# Patient Record
Sex: MALE | Race: WHITE | NOT HISPANIC OR LATINO | Employment: OTHER | URBAN - METROPOLITAN AREA
[De-identification: names, ages, dates, MRNs, and addresses within clinical notes are randomized per-mention and may not be internally consistent; named-entity substitution may affect disease eponyms.]

---

## 2017-04-04 ENCOUNTER — HISTORICAL (OUTPATIENT)
Dept: ADMINISTRATIVE | Facility: HOSPITAL | Age: 62
End: 2017-04-04

## 2017-04-04 LAB
ALBUMIN SERPL-MCNC: 3.4 G/DL (ref 3.1–4.7)
ALP SERPL-CCNC: 186 IU/L (ref 40–104)
ALT (SGPT): 34 IU/L (ref 3–33)
APTT PPP: 27.7 SEC (ref 21.7–37.8)
AST SERPL-CCNC: 41 IU/L (ref 10–40)
BILIRUB SERPL-MCNC: 0.7 MG/DL (ref 0.3–1)
BUN SERPL-MCNC: 22 MG/DL (ref 8–20)
CALCIUM SERPL-MCNC: 9.4 MG/DL (ref 7.7–10.4)
CHLORIDE: 98 MMOL/L (ref 98–110)
CO2 SERPL-SCNC: 29.6 MMOL/L (ref 22.8–31.6)
CREATININE: 1.15 MG/DL (ref 0.6–1.4)
FERRITIN SERPL-MCNC: 412 NG/ML (ref 37–201)
FOLATE SERPL-MCNC: 5.9 NG/ML (ref 2.2–11.2)
GLUCOSE: 105 MG/DL (ref 70–99)
HCT VFR BLD AUTO: 42.2 % (ref 39–55)
HGB BLD-MCNC: 13.2 G/DL (ref 14–16)
INR PPP: 1.1
MCH RBC QN AUTO: 27 PG (ref 25–35)
MCHC RBC AUTO-ENTMCNC: 31.3 G/DL (ref 31–36)
MCV RBC AUTO: 86.3 FL (ref 80–100)
NUCLEATED RBCS: 0 %
PLATELET # BLD AUTO: 333 K/UL (ref 140–440)
POTASSIUM SERPL-SCNC: 3.9 MMOL/L (ref 3.5–5)
PROT SERPL-MCNC: 7.3 G/DL (ref 6–8.2)
PROTHROMBIN TIME: 14.3 SEC (ref 11.3–15.2)
RBC # BLD AUTO: 4.89 M/UL (ref 4.3–5.9)
SODIUM: 140 MMOL/L (ref 134–144)
VITAMIN B12: 568 PG/ML (ref 62–940)
WBC # BLD AUTO: 11.6 K/UL (ref 5–10)

## 2017-04-11 LAB
BASOPHILS NFR BLD: 0 K/UL (ref 0–0.2)
BASOPHILS NFR BLD: 0.1 %
BUN SERPL-MCNC: 12 MG/DL (ref 8–20)
CALCIUM SERPL-MCNC: 8.3 MG/DL (ref 7.7–10.4)
CHLORIDE: 105 MMOL/L (ref 98–110)
CO2 SERPL-SCNC: 27.7 MMOL/L (ref 22.8–31.6)
CREATININE: 0.82 MG/DL (ref 0.6–1.4)
EOSINOPHIL NFR BLD: 0 %
EOSINOPHIL NFR BLD: 0 K/UL (ref 0–0.7)
ERYTHROCYTE [DISTWIDTH] IN BLOOD BY AUTOMATED COUNT: 14.1 % (ref 11.7–14.9)
GLUCOSE: 154 MG/DL (ref 70–99)
GRAN #: 10 K/UL (ref 1.4–6.5)
GRAN%: 82.7 %
HCT VFR BLD AUTO: 34.6 % (ref 39–55)
HGB BLD-MCNC: 10.8 G/DL (ref 14–16)
IMMATURE GRANS (ABS): 0.1 K/UL (ref 0–1)
IMMATURE GRANULOCYTES: 0.6 %
LYMPH #: 1.1 K/UL (ref 1.2–3.4)
LYMPH%: 8.7 %
MCH RBC QN AUTO: 26.6 PG (ref 25–35)
MCHC RBC AUTO-ENTMCNC: 31.2 G/DL (ref 31–36)
MCV RBC AUTO: 85.2 FL (ref 80–100)
MONO #: 1 K/UL (ref 0.1–0.6)
MONO%: 7.9 %
NUCLEATED RBCS: 0 %
PLATELET # BLD AUTO: 270 K/UL (ref 140–440)
PMV BLD AUTO: 8.5 FL (ref 8.8–12.7)
POTASSIUM SERPL-SCNC: 4.1 MMOL/L (ref 3.5–5)
RBC # BLD AUTO: 4.06 M/UL (ref 4.3–5.9)
SODIUM: 138 MMOL/L (ref 134–144)
WBC # BLD AUTO: 12.1 K/UL (ref 5–10)

## 2017-04-12 LAB
BASOPHILS NFR BLD: 0 K/UL (ref 0–0.2)
BASOPHILS NFR BLD: 0.2 %
BUN SERPL-MCNC: 13 MG/DL (ref 8–20)
CALCIUM SERPL-MCNC: 8.3 MG/DL (ref 7.7–10.4)
CHLORIDE: 102 MMOL/L (ref 98–110)
CO2 SERPL-SCNC: 28.7 MMOL/L (ref 22.8–31.6)
CREATININE: 0.87 MG/DL (ref 0.6–1.4)
EOSINOPHIL NFR BLD: 0 K/UL (ref 0–0.7)
EOSINOPHIL NFR BLD: 0.4 %
ERYTHROCYTE [DISTWIDTH] IN BLOOD BY AUTOMATED COUNT: 14.3 % (ref 11.7–14.9)
GLUCOSE: 106 MG/DL (ref 70–99)
GRAN #: 7.9 K/UL (ref 1.4–6.5)
GRAN%: 76.6 %
HCT VFR BLD AUTO: 35.9 % (ref 39–55)
HGB BLD-MCNC: 11.1 G/DL (ref 14–16)
IMMATURE GRANS (ABS): 0.1 K/UL (ref 0–1)
IMMATURE GRANULOCYTES: 0.6 %
LYMPH #: 1.1 K/UL (ref 1.2–3.4)
LYMPH%: 11.1 %
MCH RBC QN AUTO: 26.9 PG (ref 25–35)
MCHC RBC AUTO-ENTMCNC: 30.9 G/DL (ref 31–36)
MCV RBC AUTO: 86.9 FL (ref 80–100)
MONO #: 1.1 K/UL (ref 0.1–0.6)
MONO%: 11.1 %
NUCLEATED RBCS: 0 %
PLATELET # BLD AUTO: 249 K/UL (ref 140–440)
PMV BLD AUTO: 8.4 FL (ref 8.8–12.7)
POTASSIUM SERPL-SCNC: 3.6 MMOL/L (ref 3.5–5)
RBC # BLD AUTO: 4.13 M/UL (ref 4.3–5.9)
SODIUM: 140 MMOL/L (ref 134–144)
WBC # BLD AUTO: 10.3 K/UL (ref 5–10)

## 2017-04-13 LAB
BASOPHILS NFR BLD: 0 K/UL (ref 0–0.2)
BASOPHILS NFR BLD: 0.1 %
BUN SERPL-MCNC: 12 MG/DL (ref 8–20)
CALCIUM SERPL-MCNC: 8.2 MG/DL (ref 7.7–10.4)
CHLORIDE: 99 MMOL/L (ref 98–110)
CO2 SERPL-SCNC: 30.2 MMOL/L (ref 22.8–31.6)
CREATININE: 0.82 MG/DL (ref 0.6–1.4)
EOSINOPHIL NFR BLD: 0 K/UL (ref 0–0.7)
EOSINOPHIL NFR BLD: 0.3 %
ERYTHROCYTE [DISTWIDTH] IN BLOOD BY AUTOMATED COUNT: 14.2 % (ref 11.7–14.9)
GLUCOSE: 99 MG/DL (ref 70–99)
GRAN #: 8.7 K/UL (ref 1.4–6.5)
GRAN%: 81.2 %
HCT VFR BLD AUTO: 34.4 % (ref 39–55)
HGB BLD-MCNC: 10.6 G/DL (ref 14–16)
IMMATURE GRANS (ABS): 0.1 K/UL (ref 0–1)
IMMATURE GRANULOCYTES: 0.8 %
LYMPH #: 0.8 K/UL (ref 1.2–3.4)
LYMPH%: 7.4 %
MCH RBC QN AUTO: 26.7 PG (ref 25–35)
MCHC RBC AUTO-ENTMCNC: 30.8 G/DL (ref 31–36)
MCV RBC AUTO: 86.6 FL (ref 80–100)
MONO #: 1.1 K/UL (ref 0.1–0.6)
MONO%: 10.2 %
NUCLEATED RBCS: 0 %
PLATELET # BLD AUTO: 228 K/UL (ref 140–440)
PMV BLD AUTO: 8.6 FL (ref 8.8–12.7)
POTASSIUM SERPL-SCNC: 3.4 MMOL/L (ref 3.5–5)
RBC # BLD AUTO: 3.97 M/UL (ref 4.3–5.9)
SODIUM: 139 MMOL/L (ref 134–144)
WBC # BLD AUTO: 10.8 K/UL (ref 5–10)

## 2017-04-15 LAB
BUN SERPL-MCNC: 10 MG/DL (ref 8–20)
CALCIUM SERPL-MCNC: 8.1 MG/DL (ref 7.7–10.4)
CHLORIDE: 98 MMOL/L (ref 98–110)
CO2 SERPL-SCNC: 28.7 MMOL/L (ref 22.8–31.6)
CREATININE: 0.85 MG/DL (ref 0.6–1.4)
GLUCOSE: 130 MG/DL (ref 70–99)
POTASSIUM SERPL-SCNC: 2.9 MMOL/L (ref 3.5–5)
SODIUM: 137 MMOL/L (ref 134–144)

## 2017-04-20 LAB
BUN SERPL-MCNC: 15 MG/DL (ref 8–20)
CALCIUM SERPL-MCNC: 8.6 MG/DL (ref 7.7–10.4)
CHLORIDE: 98 MMOL/L (ref 98–110)
CO2 SERPL-SCNC: 27.6 MMOL/L (ref 22.8–31.6)
CREATININE: 0.89 MG/DL (ref 0.6–1.4)
GLUCOSE: 107 MG/DL (ref 70–99)
POTASSIUM SERPL-SCNC: 3.1 MMOL/L (ref 3.5–5)
SODIUM: 138 MMOL/L (ref 134–144)

## 2017-04-24 ENCOUNTER — HISTORICAL (OUTPATIENT)
Dept: ADMINISTRATIVE | Facility: HOSPITAL | Age: 62
End: 2017-04-24

## 2017-05-08 LAB
ALBUMIN SERPL-MCNC: 2.9 G/DL (ref 3.1–4.7)
ALP SERPL-CCNC: 425 IU/L (ref 40–104)
ALT (SGPT): 63 IU/L (ref 3–33)
AST SERPL-CCNC: 87 IU/L (ref 10–40)
BASOPHILS NFR BLD: 0 K/UL (ref 0–0.2)
BASOPHILS NFR BLD: 0.2 %
BILIRUB SERPL-MCNC: 0.6 MG/DL (ref 0.3–1)
BUN SERPL-MCNC: 17 MG/DL (ref 8–20)
CALCIUM SERPL-MCNC: 9.2 MG/DL (ref 7.7–10.4)
CEA: 7117.5 NG/ML
CHLORIDE: 98 MMOL/L (ref 98–110)
CO2 SERPL-SCNC: 25.9 MMOL/L (ref 22.8–31.6)
CREATININE: 1.04 MG/DL (ref 0.6–1.4)
EOSINOPHIL NFR BLD: 0.1 K/UL (ref 0–0.7)
EOSINOPHIL NFR BLD: 0.4 %
ERYTHROCYTE [DISTWIDTH] IN BLOOD BY AUTOMATED COUNT: 15 % (ref 12.5–14.5)
FERRITIN SERPL-MCNC: 874 NG/ML (ref 37–201)
GLUCOSE: 88 MG/DL (ref 70–99)
GRAN #: 10.8 K/UL (ref 1.4–6.5)
GRAN%: 77.6 %
HCT VFR BLD AUTO: 38 % (ref 39–55)
HGB BLD-MCNC: 11.1 G/DL (ref 14–16)
IMMATURE GRANS (ABS): 0.2 K/UL (ref 0–1)
IMMATURE GRANULOCYTES: 1.5 %
LYMPH #: 1.4 K/UL (ref 1.2–3.4)
LYMPH%: 10.2 %
MCH RBC QN AUTO: 24.8 PG (ref 25–35)
MCHC RBC AUTO-ENTMCNC: 29.2 G/DL (ref 31–36)
MCV RBC AUTO: 85 FL (ref 80–100)
MONO #: 1.4 K/UL (ref 0.1–0.6)
MONO%: 10.1 %
NUCLEATED RBCS: 0 %
NUCLEATED RED BLOOD CELLS: 0 /100 WBC
PERFORMED BY:: ABNORMAL
PLATELET # BLD AUTO: 318 K/UL (ref 140–440)
PMV BLD AUTO: 8.2 FL (ref 8.8–12.7)
POTASSIUM SERPL-SCNC: 4.2 MMOL/L (ref 3.5–5)
PROT SERPL-MCNC: 7 G/DL (ref 6–8.2)
RBC # BLD AUTO: 4.47 M/UL (ref 4.3–5.9)
SODIUM: 136 MMOL/L (ref 134–144)
WBC # BLD: 13.9 K/UL (ref 5–10)

## 2017-05-22 ENCOUNTER — DOCUMENTATION ONLY (OUTPATIENT)
Dept: RADIATION ONCOLOGY | Facility: CLINIC | Age: 62
End: 2017-05-22

## 2017-05-22 ENCOUNTER — OFFICE VISIT (OUTPATIENT)
Dept: HEMATOLOGY/ONCOLOGY | Facility: CLINIC | Age: 62
End: 2017-05-22
Payer: MEDICAID

## 2017-05-22 VITALS
TEMPERATURE: 98 F | DIASTOLIC BLOOD PRESSURE: 64 MMHG | BODY MASS INDEX: 22.69 KG/M2 | SYSTOLIC BLOOD PRESSURE: 88 MMHG | HEART RATE: 108 BPM | WEIGHT: 172 LBS | RESPIRATION RATE: 20 BRPM

## 2017-05-22 DIAGNOSIS — M54.50 LOW BACK PAIN, NON-SPECIFIC: ICD-10-CM

## 2017-05-22 DIAGNOSIS — R64 CACHEXIA: ICD-10-CM

## 2017-05-22 DIAGNOSIS — C78.7 ADENOCARCINOMA OF COLON METASTATIC TO LIVER: ICD-10-CM

## 2017-05-22 DIAGNOSIS — C18.9 ADENOCARCINOMA OF COLON METASTATIC TO LIVER: ICD-10-CM

## 2017-05-22 DIAGNOSIS — E86.0 DEHYDRATION, MODERATE: ICD-10-CM

## 2017-05-22 DIAGNOSIS — C18.2 COLON CANCER, ASCENDING: Primary | ICD-10-CM

## 2017-05-22 PROCEDURE — 99214 OFFICE O/P EST MOD 30 MIN: CPT | Mod: ,,, | Performed by: INTERNAL MEDICINE

## 2017-05-22 RX ORDER — TRAMADOL HYDROCHLORIDE 50 MG/1
50 TABLET ORAL EVERY 6 HOURS PRN
COMMUNITY
End: 2017-07-26 | Stop reason: ALTCHOICE

## 2017-05-22 RX ORDER — DRONABINOL 2.5 MG/1
2.5 CAPSULE ORAL
Qty: 60 CAPSULE | Refills: 0
Start: 2017-05-22 | End: 2017-06-13

## 2017-05-22 RX ORDER — HYDROCODONE BITARTRATE AND ACETAMINOPHEN 10; 325 MG/1; MG/1
1 TABLET ORAL EVERY 6 HOURS PRN
COMMUNITY
End: 2017-07-25

## 2017-05-22 RX ORDER — DOCUSATE SODIUM 100 MG/1
100 CAPSULE, LIQUID FILLED ORAL 2 TIMES DAILY
COMMUNITY
End: 2017-07-25

## 2017-05-22 NOTE — PROGRESS NOTES
"Wilian is a 60 yo male with colon cancer with metastasis to the liver, who is not getting treatment at this time.  He evidently had an appointment with Dr. Porter and he was severely dehydrated and was sent to infusion for fluids.  He was prescribed marinol as an appetite stimulant.  He did state that he smokes marijuana at home and it does help with his appetite.  He is lactose intolerant.    Weight: 172 lbs. Weight in December 2016 : 216 lbs. He has lost 44 lbs. Over the past 5 months = 20% loss of weight.  He is at high risk nutritionally.    He attributes his weight loss to "food tastes badly," and poor appetite.  His bowels move daily.  Diet recall shows he is eating around 1000 calories daily.  He is drinking approximately 4-5 cups of fluid daily, mostly water and lactose free milk and milkshakes.    Plan: 1. Advised Wilian to aim for at least 2000 calories daily. 2. Advised that he eat or drink 1/2 cup of something every 1-2 hours.  Wife agreed to purchase nutrition supplements and easy to grab foods. 3. Gave list of calorie and protein dense foods to choose.  Gave list of snack and small meal ideas. 4. Gave tips on how to add calories, nothing plain etc. 5. Advised that he increase his fluid intake to 8 cups daily. Gave samples of vanilla ensure plus and vanilla boost plus as well as coupons. 6. RD contact information left with patient and wife.  "

## 2017-05-23 ENCOUNTER — TELEPHONE (OUTPATIENT)
Dept: HEMATOLOGY/ONCOLOGY | Facility: CLINIC | Age: 62
End: 2017-05-23

## 2017-05-23 DIAGNOSIS — R64 MALIGNANT CACHEXIA: ICD-10-CM

## 2017-05-23 DIAGNOSIS — R63.0 ANOREXIA: Primary | ICD-10-CM

## 2017-05-23 NOTE — TELEPHONE ENCOUNTER
Dr. Porter,     Antonieta at [facility] called stating that the marinol script  you gave this person is not being covered by his [employer] company. She did a test claim on Megace and says this is covered at $.50  for a bottle. Do you want to order this med instead?     Harriet    [id number]  [facility]    [phone] [facility]

## 2017-05-23 NOTE — TELEPHONE ENCOUNTER
Patient's wife given information regarding establishing PCP.  Patient has an appointment with Dr. Viola Amin on 6/21/17 @ 9:20am.  Instructed to arrive 20 minutes early to complete new patient information.

## 2017-05-23 NOTE — PROGRESS NOTES
[facility] HEME/ONC PROGRESS NOTE      Subjective:       Patient ID:   Sarbjit Ruiz  62 y.o. male.  1955      Chief Complaint:  Weakness    History of Present Illness:   63 y/o male with little po intake, taste is off, and increasing weakness.  S/P R hemicolectomy, liver bx.  Stage IV colon cancer.  Smoking marijuana to try increase appetite.  Hx of LBP, didc dx.  Turning over in bed, hurt his back with increased pain.  Spends most of time in bed or wheelchair.  He ranks himself at 1 out of 1-10.    Patient returns today for a regularly scheduled follow-up visit.             ROS:   GEN: loss of appetite, and weight .  HEENT: normal with no HA's, sore throat, stiff neck, changes in vision  CV: normal with no CP, SOB, PND, HODGES or orthopnea  PULM: normal with no SOB, cough, hemoptysis, sputum or pleuritic pain  GI: See PI., no abdominal pain, nausea, vomiting, constipation, diarrhea, melanotic stools, BRBPR, or hematemesis  : normal with no hematuria, dysuria  BREAST: normal with no mass, discharge, pain  SKIN: normal with no rash, erythema, bruising, or swelling  Neuro: LBP+    Allergies:  Review of patient's allergies indicates:  No Known Allergies    Medications:    Current Outpatient Prescriptions:     calcium carbonate (CALCIUM ANTACID) 300 mg (750 mg) Chew, Take 1 mg by mouth 2 (two) times daily as needed., Disp: , Rfl:     docusate sodium (COLACE) 100 MG capsule, Take 100 mg by mouth 2 (two) times daily., Disp: , Rfl:     hydrocodone-acetaminophen 10-325mg (NORCO)  mg Tab, Take 1 tablet by mouth every 6 (six) hours as needed for Pain., Disp: , Rfl:     L. ACIDOPHILUS/BIFID. ANIMALIS (ONE-A-DAY TRUBIOTICS ORAL), Take by mouth., Disp: , Rfl:     tramadol (ULTRAM) 50 mg tablet, Take 50 mg by mouth every 6 (six) hours as needed for Pain., Disp: , Rfl:     dronabinol (MARINOL) 2.5 MG capsule, Take 1 capsule (2.5 mg total) by mouth 2 (two) times daily before meals., Disp: 60 capsule, Rfl: 0     ranitidine (ZANTAC) 300 MG capsule, Take 1 capsule (300 mg total) by mouth every evening., Disp: 30 capsule, Rfl: 3    ranitidine (ZANTAC) 300 MG tablet, Take 1 tablet (300 mg total) by mouth every evening., Disp: 30 tablet, Rfl: 5      Objective:     Vitals:  Blood pressure (!) 88/64, pulse 108, temperature 97.8 °F (36.6 °C), resp. rate 20, weight 78 kg (172 lb).    Physical Examination:   GEN: chronically ill in appearance, thin , in wheelchair  HEAD: atraumatic and normocephalic, thin  EYES: no pallor, no icterus, PERRLA  ENT: OMM, no pharyngeal erythema, external ears WNL; no nasal discharge; no thrush  NECK: no masses, thyroid normal, trachea midline, no LAD/LN's, supple  CV: RRR with no murmur; normal pulse; normal S1 and S2; no pedal edema  CHEST: Normal respiratory effort; CTAB; normal breath sounds; no wheeze or crackles  ABDOM: nontender and nondistended; soft; normal bowel sounds; no rebound/guarding  MUSC/Skeletal: ROM normal; no crepitus; joints normal; no deformities or arthropathy  EXTREM: no clubbing, cyanosis, inflammation or swelling  SKIN: no rashes, lesions, ulcers, petechiae or subcutaneous nodules  : no torre  NEURO: grossly intact; motor/sensory WNL; AAOx3; no tremors  PSYCH: normal mood, affect and behavior  LYMPH: normal cervical, supraclavicular, axillary and groin LN'  Neuro: no focal findings.  NT L/S spine area.  Calf NT.  No edema.            Labs:   Lab Results   Component Value Date    WBC 13.9 (H) 05/08/2017    HGB 11.1 (L) 05/08/2017    HCT 38.0 (L) 05/08/2017    MCV 86.6 04/13/2017     05/08/2017    CMP  Sodium   Date Value Ref Range Status   05/08/2017 136 134 - 144 mmol/L      Potassium   Date Value Ref Range Status   05/08/2017 4.2 3.5 - 5.0 mmol/L      Chloride   Date Value Ref Range Status   05/08/2017 98 98 - 110 mmol/L      CO2   Date Value Ref Range Status   05/08/2017 25.9 22.8 - 31.6 mmol/L      Glucose   Date Value Ref Range Status   05/08/2017 88 70 - 99 mg/dL       BUN, Bld   Date Value Ref Range Status   05/08/2017 17 8 - 20 mg/dL      Creatinine   Date Value Ref Range Status   05/08/2017 1.04 0.60 - 1.40 mg/dL      Calcium   Date Value Ref Range Status   05/08/2017 9.2 7.7 - 10.4 mg/dL      Total Protein   Date Value Ref Range Status   05/08/2017 7.0 6.0 - 8.2 g/dL      Albumin   Date Value Ref Range Status   05/08/2017 2.9 (L) 3.1 - 4.7 g/dL      Total Bilirubin   Date Value Ref Range Status   05/08/2017 0.6 0.3 - 1.0 mg/dL      Alkaline Phosphatase   Date Value Ref Range Status   05/08/2017 425 (H) 40 - 104 IU/L      AST   Date Value Ref Range Status   05/08/2017 87 (H) 10 - 40 IU/L      I have reviewed all available lab results and radiology reports.    Radiology/Diagnognostics:  5/18/17  Post op change, increasing liver metastases, mild splenomegaly    Assessment/Plan:   (1) 62 y.o. male with diagnosis of Stage IV colon cancer of ascending colon with liver metastases.  Due to start FOLFOX/Avastin for metastatic dx.  But performance status is low with BP 88/  ,decreased po intake, clinical dehydration, further loss of weight.      (2)LBP, disc dx. Positional pain.          ICD-10-CM ICD-9-CM   1. Colon cancer, ascending C18.2 153.6   2. Adenocarcinoma of colon metastatic to liver C18.9 153.9    C78.7 197.7   3. Cachexia R64 799.4   4. Dehydration, moderate E86.0 276.51   5. Low back pain, non-specific M54.5 724.2       Discussion:   Need to defer chemo for now.  Give hydration daily x's 3 days.  Add marinol to try increase po intake.  He has po pain meds.  SW consult to try increase assistance, held with getting PMD for him.  Dietician consult to increase po intake.  See me 1 weeks.  If performance status improves sufficiently, then will try to start FOLfiri/Avastin for palliation.        I have explained and the patient understands all of  the current recommendation(s). I have answered all of their questions to the best of my ability and to their complete  satisfaction.   The patient is to continue with the current management plan.    RTC in 1 weeks        Electronically signed by LUBNA Pelletier

## 2017-05-24 ENCOUNTER — DOCUMENTATION ONLY (OUTPATIENT)
Dept: HEMATOLOGY/ONCOLOGY | Facility: CLINIC | Age: 62
End: 2017-05-24

## 2017-05-25 RX ORDER — MEGESTROL ACETATE 40 MG/ML
200 SUSPENSION ORAL DAILY
Qty: 240 ML | Refills: 2 | Status: SHIPPED | OUTPATIENT
Start: 2017-05-25 | End: 2017-06-13

## 2017-05-25 RX ORDER — MEGESTROL ACETATE 40 MG/ML
200 SUSPENSION ORAL DAILY
Qty: 240 ML | Refills: 2 | Status: SHIPPED | OUTPATIENT
Start: 2017-05-25 | End: 2017-05-25 | Stop reason: SDUPTHER

## 2017-05-25 RX ORDER — NEOMYCIN SULFATE 500 MG/1
TABLET ORAL
Refills: 0 | COMMUNITY
Start: 2017-04-07 | End: 2017-06-13 | Stop reason: ALTCHOICE

## 2017-05-25 RX ORDER — ERYTHROMYCIN 500 MG/1
TABLET, COATED ORAL
Refills: 0 | COMMUNITY
Start: 2017-04-07 | End: 2017-06-13 | Stop reason: ALTCHOICE

## 2017-05-25 RX ORDER — HYDROCODONE BITARTRATE AND ACETAMINOPHEN 7.5; 325 MG/1; MG/1
TABLET ORAL
Refills: 0 | COMMUNITY
Start: 2017-04-24 | End: 2017-07-25

## 2017-05-25 RX ORDER — ESCITALOPRAM OXALATE 10 MG/1
TABLET ORAL
Refills: 6 | COMMUNITY
Start: 2017-04-26 | End: 2017-07-26 | Stop reason: DRUGHIGH

## 2017-05-25 RX ORDER — ALPRAZOLAM 0.25 MG/1
TABLET ORAL
Refills: 0 | COMMUNITY
Start: 2017-03-25 | End: 2017-07-26 | Stop reason: ALTCHOICE

## 2017-05-26 ENCOUNTER — TELEPHONE (OUTPATIENT)
Dept: HEMATOLOGY/ONCOLOGY | Facility: CLINIC | Age: 62
End: 2017-05-26

## 2017-05-26 NOTE — TELEPHONE ENCOUNTER
Pt wife called in said she went to [facility] in [place] and RX was not called in. In prior messages it shows that it was called in. Please call pt wife back.

## 2017-05-26 NOTE — TELEPHONE ENCOUNTER
Spoke with pts wife notified that  has called in a script for megace. Insurance would not cover marinol. Understanding verbalized

## 2017-05-30 ENCOUNTER — OFFICE VISIT (OUTPATIENT)
Dept: HEMATOLOGY/ONCOLOGY | Facility: CLINIC | Age: 62
End: 2017-05-30
Payer: MEDICAID

## 2017-05-30 VITALS
BODY MASS INDEX: 22.56 KG/M2 | HEART RATE: 75 BPM | TEMPERATURE: 98 F | WEIGHT: 171 LBS | SYSTOLIC BLOOD PRESSURE: 90 MMHG | DIASTOLIC BLOOD PRESSURE: 65 MMHG | RESPIRATION RATE: 18 BRPM

## 2017-05-30 DIAGNOSIS — C18.4 CANCER OF TRANSVERSE COLON METASTATIC TO INTRA-ABDOMINAL LYMPH NODE: Primary | ICD-10-CM

## 2017-05-30 DIAGNOSIS — C77.2 CANCER OF TRANSVERSE COLON METASTATIC TO INTRA-ABDOMINAL LYMPH NODE: Primary | ICD-10-CM

## 2017-05-30 DIAGNOSIS — D50.0 IRON DEFICIENCY ANEMIA DUE TO CHRONIC BLOOD LOSS: ICD-10-CM

## 2017-05-30 DIAGNOSIS — C78.7 ADENOCARCINOMA OF COLON METASTATIC TO LIVER: ICD-10-CM

## 2017-05-30 DIAGNOSIS — C18.9 ADENOCARCINOMA OF COLON METASTATIC TO LIVER: ICD-10-CM

## 2017-05-30 PROCEDURE — 99214 OFFICE O/P EST MOD 30 MIN: CPT | Mod: ,,, | Performed by: INTERNAL MEDICINE

## 2017-06-05 ENCOUNTER — HISTORICAL (OUTPATIENT)
Dept: ADMINISTRATIVE | Facility: HOSPITAL | Age: 62
End: 2017-06-05

## 2017-06-05 LAB
ALBUMIN SERPL-MCNC: 2 G/DL (ref 3.1–4.7)
ALP SERPL-CCNC: 570 IU/L (ref 40–104)
ALT (SGPT): 33 IU/L (ref 3–33)
APTT PPP: 26.7 SEC (ref 21.7–37.8)
AST SERPL-CCNC: 93 IU/L (ref 10–40)
BASOPHILS NFR BLD: 0.1 K/UL (ref 0–0.2)
BASOPHILS NFR BLD: 0.2 %
BILIRUB SERPL-MCNC: 2.7 MG/DL (ref 0.3–1)
BUN SERPL-MCNC: 25 MG/DL (ref 8–20)
CALCIUM SERPL-MCNC: 9.5 MG/DL (ref 7.7–10.4)
CEA: 8726.3 NG/ML
CHLORIDE: 97 MMOL/L (ref 98–110)
CO2 SERPL-SCNC: 24.9 MMOL/L (ref 22.8–31.6)
CREATININE: 1.24 MG/DL (ref 0.6–1.4)
EOSINOPHIL NFR BLD: 0 K/UL (ref 0–0.7)
EOSINOPHIL NFR BLD: 0.1 %
ERYTHROCYTE [DISTWIDTH] IN BLOOD BY AUTOMATED COUNT: 19.5 % (ref 11.7–14.9)
GLUCOSE: 106 MG/DL (ref 70–99)
GRAN #: 17.1 K/UL (ref 1.4–6.5)
GRAN%: 84.7 %
HCT VFR BLD AUTO: 30 % (ref 39–55)
HGB BLD-MCNC: 9.4 G/DL (ref 14–16)
IMMATURE GRANS (ABS): 0.5 K/UL (ref 0–1)
IMMATURE GRANULOCYTES: 2.5 %
INR PPP: 1.1
LYMPH #: 1.3 K/UL (ref 1.2–3.4)
LYMPH%: 6.4 %
MCH RBC QN AUTO: 26.4 PG (ref 25–35)
MCHC RBC AUTO-ENTMCNC: 31.3 G/DL (ref 31–36)
MCV RBC AUTO: 84.3 FL (ref 80–100)
MONO #: 1.2 K/UL (ref 0.1–0.6)
MONO%: 6.1 %
NUCLEATED RBCS: 0 %
PLATELET # BLD AUTO: 220 K/UL (ref 140–440)
PMV BLD AUTO: 8.9 FL (ref 8.8–12.7)
POTASSIUM SERPL-SCNC: 4 MMOL/L (ref 3.5–5)
PROT SERPL-MCNC: 5.9 G/DL (ref 6–8.2)
PROTHROMBIN TIME: 14.6 SEC (ref 11.3–15.2)
RBC # BLD AUTO: 3.56 M/UL (ref 4.3–5.9)
SODIUM: 132 MMOL/L (ref 134–144)
WBC # BLD AUTO: 20.2 K/UL (ref 5–10)

## 2017-06-05 NOTE — PROGRESS NOTES
[facility] HEME/ONC PROGRESS NOTE      Subjective:       Patient ID:   Sarbjit Ruiz  62 y.o. male.  1955      Chief Complaint:  Colon Cancer (liver mets; c/os extreme weakness  )      History of Present Illness:     Patient returns today for a regularly scheduled follow-up visit.   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  He has liver metastases, failure to thrive, chronic pain sx.                ROS:   GEN: normal without any fever, night sweats or weight loss  HEENT: normal with no HA's, sore throat, stiff neck, changes in vision  CV: normal with no CP, SOB, PND, HODGES or orthopnea  PULM: HODGES +., no SOB, cough, hemoptysis, sputum or pleuritic pain  GI: See HPI, Liver metasteses. no abdominal pain, nausea, vomiting, constipation, diarrhea, melanotic stools, BRBPR, or hematemesis  : BPH.+.   normal with no hematuria, dysuria  BREAST: normal with no mass, discharge, pain  SKIN: normal with no rash, erythema, bruising, or swelling  Psych: depression hx      Hx hernia repair, T & A.      Allergies:  Review of patient's allergies indicates:  No Known Allergies    Medications:    Current Outpatient Prescriptions:     alprazolam (XANAX) 0.25 MG tablet, TK 1 T PO Q 8 H PRN P, Disp: , Rfl: 0    calcium carbonate (CALCIUM ANTACID) 300 mg (750 mg) Chew, Take 1 mg by mouth 2 (two) times daily as needed., Disp: , Rfl:     docusate sodium (COLACE) 100 MG capsule, Take 100 mg by mouth 2 (two) times daily., Disp: , Rfl:     dronabinol (MARINOL) 2.5 MG capsule, Take 1 capsule (2.5 mg total) by mouth 2 (two) times daily before meals., Disp: 60 capsule, Rfl: 0    erythromycin base (E-MYCIN) 500 MG tablet, , Disp: , Rfl: 0    escitalopram oxalate (LEXAPRO) 10 MG tablet, TK 1 T PO QD, Disp: , Rfl: 6    hydrocodone-acetaminophen 10-325mg (NORCO)  mg Tab, Take 1 tablet by mouth every 6 (six) hours as needed for Pain., Disp: , Rfl:     hydrocodone-acetaminophen 7.5-325mg (NORCO) 7.5-325 mg per tablet, TK  1 T PO Q 4 H PRN, Disp: , Rfl: 0    L. ACIDOPHILUS/BIFID. ANIMALIS (ONE-A-DAY TRUBIOTICS ORAL), Take by mouth., Disp: , Rfl:     megestrol (MEGACE) 400 mg/10 mL (40 mg/mL) Susp, Take 5 mLs (200 mg total) by mouth once daily., Disp: 240 mL, Rfl: 2    neomycin (MYCIFRADIN) 500 mg Tab, , Disp: , Rfl: 0    ranitidine (ZANTAC) 300 MG capsule, Take 1 capsule (300 mg total) by mouth every evening., Disp: 30 capsule, Rfl: 3    ranitidine (ZANTAC) 300 MG tablet, Take 1 tablet (300 mg total) by mouth every evening., Disp: 30 tablet, Rfl: 5    tramadol (ULTRAM) 50 mg tablet, Take 50 mg by mouth every 6 (six) hours as needed for Pain., Disp: , Rfl:       Objective:     Vitals:  Blood pressure 90/65, pulse 75, temperature 97.8 °F (36.6 °C), resp. rate 18, weight 77.6 kg (171 lb).    Physical Examination:   GEN: chronically ill in appearance., comfortable; AAOx3  HEAD: atraumatic and normocephalic  EYES: + ,  pallor, no icterus, PERRLA  ENT: OMM, no pharyngeal erythema, external ears WNL; no nasal discharge; no thrush  NECK: no masses, thyroid normal, trachea midline, no LAD/LN's, supple  CV: RRR with no murmur; normal pulse; normal S1 and S2; no pedal edema  CHEST: Normal respiratory effort; CTAB; normal breath sounds; no wheeze or crackles  ABDOM: nontender and nondistended; soft; normal bowel sounds; no rebound/guarding, abdomenal wound healing  MUSC/Skeletal: ROM normal; no crepitus; joints normal; no deformities or arthropathy  EXTREM: no clubbing, cyanosis, inflammation or swelling  SKIN: no rashes, lesions, ulcers, petechiae or subcutaneous nodules  : no torre  NEURO: grossly intact; motor/sensory WNL; AAOx3; no tremors  PSYCH: normal mood, affect and behavior  LYMPH: normal cervical, supraclavicular, axillary and groin LN's            Labs:   Lab Results   Component Value Date    WBC 13.9 (H) 05/08/2017    HGB 11.1 (L) 05/08/2017    HCT 38.0 (L) 05/08/2017    MCV 86.6 04/13/2017     05/08/2017     CMP  Sodium   Date Value Ref Range Status   05/08/2017 136 134 - 144 mmol/L      Potassium   Date Value Ref Range Status   05/08/2017 4.2 3.5 - 5.0 mmol/L      Chloride   Date Value Ref Range Status   05/08/2017 98 98 - 110 mmol/L      CO2   Date Value Ref Range Status   05/08/2017 25.9 22.8 - 31.6 mmol/L      Glucose   Date Value Ref Range Status   05/08/2017 88 70 - 99 mg/dL      BUN, Bld   Date Value Ref Range Status   05/08/2017 17 8 - 20 mg/dL      Creatinine   Date Value Ref Range Status   05/08/2017 1.04 0.60 - 1.40 mg/dL      Calcium   Date Value Ref Range Status   05/08/2017 9.2 7.7 - 10.4 mg/dL      Total Protein   Date Value Ref Range Status   05/08/2017 7.0 6.0 - 8.2 g/dL      Albumin   Date Value Ref Range Status   05/08/2017 2.9 (L) 3.1 - 4.7 g/dL      Total Bilirubin   Date Value Ref Range Status   05/08/2017 0.6 0.3 - 1.0 mg/dL      Alkaline Phosphatase   Date Value Ref Range Status   05/08/2017 425 (H) 40 - 104 IU/L      AST   Date Value Ref Range Status   05/08/2017 87 (H) 10 - 40 IU/L      I have reviewed available lab results and radiology reports.    Radiology/Diagnostic Studies:    CAT 3/2017  Liver metasteses +    Assessment/Plan:   (1) 62 y.o. male with diagnosis of stage 4 colon cancer, metastatic dx to liver.    (2)Iron deficiency anemia, S/P ferrlicet infusion.    (3)Discussed Folfox/ Avastin , give course 1 in hospital.       Watch for cold intolerance, peripheral neuropathy, wound complications.         (4) Admit for chemo.            Discussion:     I have explained and the patient understands all of  the current recommendation(s). I have answered all of their questions to the best of my ability and to their complete satisfaction.   The patient is to continue with the current management plan.    RTC in  2 weeks.        Electronically signed by LUBNA Pelletier

## 2017-06-13 ENCOUNTER — OFFICE VISIT (OUTPATIENT)
Dept: HEMATOLOGY/ONCOLOGY | Facility: CLINIC | Age: 62
End: 2017-06-13
Payer: MEDICAID

## 2017-06-13 VITALS
WEIGHT: 143.81 LBS | SYSTOLIC BLOOD PRESSURE: 106 MMHG | DIASTOLIC BLOOD PRESSURE: 74 MMHG | BODY MASS INDEX: 18.97 KG/M2 | TEMPERATURE: 97 F | HEART RATE: 93 BPM

## 2017-06-13 DIAGNOSIS — E86.0 DEHYDRATION, MILD: ICD-10-CM

## 2017-06-13 DIAGNOSIS — E86.0 DEHYDRATION, MODERATE: Primary | ICD-10-CM

## 2017-06-13 PROCEDURE — 99214 OFFICE O/P EST MOD 30 MIN: CPT | Mod: ,,, | Performed by: INTERNAL MEDICINE

## 2017-06-13 RX ORDER — HEPARIN 100 UNIT/ML
500 SYRINGE INTRAVENOUS
Status: CANCELLED | OUTPATIENT
Start: 2017-06-13

## 2017-06-13 RX ORDER — SODIUM CHLORIDE 0.9 % (FLUSH) 0.9 %
10 SYRINGE (ML) INJECTION
Status: CANCELLED | OUTPATIENT
Start: 2017-06-13

## 2017-06-14 NOTE — PROGRESS NOTES
[facility] HEME/ONC PROGRESS NOTE      Subjective:       Patient ID:   Sarbjit Ruiz  62 y.o. male.  1955      Chief Complaint:  Colon cancer follow-up    History of Present Illness:     Patient returns today for a regularly scheduled follow-up visit.   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  He has liver metastases, failure to thrive, chronic pain sx.    Nausea has been controlled, he is without mucositis or diarrhea. He complains of altered taste and increasing weakness. He does not take his Megace but he does smoke marijuana to help with nausea control and try and increase his appetite. He denies pain, but admits to shortness of breath with exertion.    Clinically he appears frail, mucosa is dry, he has tenting of the skin, and appears dehydrated. He is agreeable to get IV fluids daily ×3 days, this has helped him in the past.    Clinically he appears depressed, he is tearful, he is on Lexapro 10 mg by mouth daily. I have discussed with him the option of giving him the second cycle of chemotherapy of FOLFOX Avastin inpatient as inpatient next week versus comfort care measures with hospice. He would like to continue on the chemotherapy next week if able.                ROS:   GEN: normal without any fever, night sweats or weight loss  HEENT: normal with no HA's, sore throat, stiff neck, changes in vision  CV: normal with no CP, SOB, PND, HODGES or orthopnea  PULM: HODGES +., no SOB, cough, hemoptysis, sputum or pleuritic pain  GI: See HPI, Liver metasteses. no abdominal pain, nausea, vomiting, constipation, diarrhea, melanotic stools, BRBPR, or hematemesis  : BPH.+.   normal with no hematuria, dysuria  BREAST: normal with no mass, discharge, pain  SKIN: normal with no rash, erythema, bruising, or swelling  Psych: depression hx      Hx hernia repair, T & A.      Allergies:  Review of patient's allergies indicates:  No Known Allergies    Medications:    Current Outpatient Prescriptions:      escitalopram oxalate (LEXAPRO) 10 MG tablet, TK 1 T PO QD, Disp: , Rfl: 6    L. ACIDOPHILUS/BIFID. ANIMALIS (ONE-A-DAY TRUBIOTICS ORAL), Take by mouth., Disp: , Rfl:     ranitidine (ZANTAC) 300 MG capsule, Take 1 capsule (300 mg total) by mouth every evening., Disp: 30 capsule, Rfl: 3    alprazolam (XANAX) 0.25 MG tablet, TK 1 T PO Q 8 H PRN P, Disp: , Rfl: 0    calcium carbonate (CALCIUM ANTACID) 300 mg (750 mg) Chew, Take 1 mg by mouth 2 (two) times daily as needed., Disp: , Rfl:     docusate sodium (COLACE) 100 MG capsule, Take 100 mg by mouth 2 (two) times daily., Disp: , Rfl:     hydrocodone-acetaminophen 10-325mg (NORCO)  mg Tab, Take 1 tablet by mouth every 6 (six) hours as needed for Pain., Disp: , Rfl:     hydrocodone-acetaminophen 7.5-325mg (NORCO) 7.5-325 mg per tablet, TK 1 T PO Q 4 H PRN, Disp: , Rfl: 0    ranitidine (ZANTAC) 300 MG tablet, Take 1 tablet (300 mg total) by mouth every evening., Disp: 30 tablet, Rfl: 5    tramadol (ULTRAM) 50 mg tablet, Take 50 mg by mouth every 6 (six) hours as needed for Pain., Disp: , Rfl:       Objective:     Vitals:  Blood pressure 106/74, pulse 93, temperature 97.3 °F (36.3 °C), weight 65.2 kg (143 lb 12.8 oz).    Physical Examination:   GEN: chronically ill in appearance., comfortable; AAOx3  HEAD: atraumatic and normocephalic  EYES: + ,  pallor, no icterus, PERRLA  ENT: OMM, no pharyngeal erythema, external ears WNL; no nasal discharge; no thrush  NECK: no masses, thyroid normal, trachea midline, no LAD/LN's, supple  CV: RRR with no murmur; normal pulse; normal S1 and S2; no pedal edema  CHEST: Normal respiratory effort; CTAB; normal breath sounds; no wheeze or crackles  ABDOM: nontender and nondistended; soft; normal bowel sounds; no rebound/guarding, abdomenal wound healing  MUSC/Skeletal: ROM normal; no crepitus; joints normal; no deformities or arthropathy  EXTREM: no clubbing, cyanosis, inflammation or swelling  SKIN: no rashes, lesions,  ulcers, petechiae or subcutaneous nodules  : no torre  NEURO: grossly intact; motor/sensory WNL; AAOx3; no tremors  PSYCH: normal mood, affect and behavior  LYMPH: normal cervical, supraclavicular, axillary and groin LN's          Labs:    CMP,CBC is pending. Baseline CEA in the 8,000 range        Radiology/Diagnostic Studies:    CAT 3/2017  Liver metasteses +    Assessment/Plan:   (1) 62 y.o. male with diagnosis of stage 4 colon cancer, metastatic dx to liver.Will hydrate over the next 3 days.    (2)Iron deficiency anemia, S/P ferrlicet infusion.    (3)Discussed Folfox/ Avastin , give course 2 in hospital.       Watch for cold intolerance, peripheral neuropathy, wound complications.         (4) Admit for chemo. 6/20.            Discussion:     I have explained and the patient understands all of  the current recommendation(s). I have answered all of their questions to the best of my ability and to their complete satisfaction.   The patient is to continue with the current management plan.    RTC in  2 weeks.        Electronically signed by LUBNA Pelletier

## 2017-06-15 ENCOUNTER — TELEPHONE (OUTPATIENT)
Dept: HEMATOLOGY/ONCOLOGY | Facility: CLINIC | Age: 62
End: 2017-06-15

## 2017-06-15 NOTE — TELEPHONE ENCOUNTER
----- Message from Tenisha Choudhury sent at 6/15/2017 11:07 AM CDT -----  Contact: ainsley with [facility]   Ainsley  With [facility] called said that this patient was referred to them and they can not take him on as a patient due to his insurance. Just wanted to let us know.

## 2017-06-19 ENCOUNTER — TELEPHONE (OUTPATIENT)
Dept: HEMATOLOGY/ONCOLOGY | Facility: CLINIC | Age: 62
End: 2017-06-19

## 2017-06-19 DIAGNOSIS — C18.4 MALIGNANT NEOPLASM OF TRANSVERSE COLON: Primary | ICD-10-CM

## 2017-06-19 DIAGNOSIS — D50.0 IRON DEFICIENCY ANEMIA DUE TO CHRONIC BLOOD LOSS: ICD-10-CM

## 2017-06-19 NOTE — TELEPHONE ENCOUNTER
----- Message from Tenisha Choudhury sent at 6/19/2017  9:12 AM CDT -----  Contact: pt wife   Pt wife called in said they were suppose to be referred to a home health to have them come by today and she has heard nothing. She said no company has called her yet. Please call pt.

## 2017-06-19 NOTE — TELEPHONE ENCOUNTER
Notified pt. We called 3 different [facility] and no one would take him because of insurance.VU they wish to wait until 6/20 for labs and admit on Wed. 6/21.

## 2017-06-20 ENCOUNTER — TELEPHONE (OUTPATIENT)
Dept: HEMATOLOGY/ONCOLOGY | Facility: CLINIC | Age: 62
End: 2017-06-20

## 2017-06-20 LAB
ALBUMIN SERPL-MCNC: 2.6 G/DL (ref 3.1–4.7)
ALP SERPL-CCNC: 472 IU/L (ref 40–104)
ALT (SGPT): 25 IU/L (ref 3–33)
AST SERPL-CCNC: 72 IU/L (ref 10–40)
BASOPHILS NFR BLD: 0 %
BASOPHILS NFR BLD: 0 K/UL (ref 0–0.2)
BILIRUB SERPL-MCNC: 1.4 MG/DL (ref 0.3–1)
BUN SERPL-MCNC: 13 MG/DL (ref 8–20)
CALCIUM SERPL-MCNC: 8.8 MG/DL (ref 7.7–10.4)
CEA: 4658.4 NG/ML
CHLORIDE: 98 MMOL/L (ref 98–110)
CO2 SERPL-SCNC: 26.3 MMOL/L (ref 22.8–31.6)
CREATININE: 0.95 MG/DL (ref 0.6–1.4)
EOSINOPHIL NFR BLD: 0.1 K/UL (ref 0–0.7)
EOSINOPHIL NFR BLD: 2.4 %
ERYTHROCYTE [DISTWIDTH] IN BLOOD BY AUTOMATED COUNT: 20.3 % (ref 12.5–14.5)
FERRITIN SERPL-MCNC: 944 NG/ML (ref 37–201)
GLUCOSE: 99 MG/DL (ref 70–99)
GRAN #: 0.7 K/UL (ref 1.4–6.5)
GRAN%: 34.7 %
HCT VFR BLD AUTO: 29.1 % (ref 39–55)
HGB BLD-MCNC: 8.7 G/DL (ref 14–16)
IMMATURE GRANS (ABS): 0 K/UL (ref 0–1)
IMMATURE GRANULOCYTES: 0 %
LYMPH #: 0.8 K/UL (ref 1.2–3.4)
LYMPH%: 36.6 %
MCH RBC QN AUTO: 26.9 PG (ref 25–35)
MCHC RBC AUTO-ENTMCNC: 29.9 G/DL (ref 31–36)
MCV RBC AUTO: 89.8 FL (ref 80–100)
MONO #: 0.5 K/UL (ref 0.1–0.6)
MONO%: 26.3 %
NUCLEATED RBCS: 0 %
NUCLEATED RED BLOOD CELLS: 0 /100 WBC
PERFORMED BY:: ABNORMAL
PLATELET # BLD AUTO: 205 K/UL (ref 140–440)
PMV BLD AUTO: 8.4 FL (ref 8.8–12.7)
POTASSIUM SERPL-SCNC: 3.9 MMOL/L (ref 3.5–5)
PROT SERPL-MCNC: 6.3 G/DL (ref 6–8.2)
RBC # BLD AUTO: 3.24 M/UL (ref 4.3–5.9)
SODIUM: 135 MMOL/L (ref 134–144)
WBC # BLD: 2.1 K/UL (ref 5–10)

## 2017-06-20 NOTE — TELEPHONE ENCOUNTER
Spoke with pts wife to inform her of WBC too low to treat on 6/20 will cancel admit and reschedule chemo for 6/27. Need to check labs again next Mon. Notified ns supervisor of this and Ludmila to change auth dates. Wife requesting to see MD possibly next Mon. As pt is having thoughts of maybe not doing chemo but wishes to talk with him first.

## 2017-06-21 ENCOUNTER — TELEPHONE (OUTPATIENT)
Dept: HEMATOLOGY/ONCOLOGY | Facility: CLINIC | Age: 62
End: 2017-06-21

## 2017-06-21 NOTE — TELEPHONE ENCOUNTER
Spoke with pts wife. She stes pt having pxs with diarrhea. Instructed to try OTC Immodium follow directions on box. If no relief would give Rx for Lomotil. ROSA.

## 2017-06-21 NOTE — TELEPHONE ENCOUNTER
----- Message from Kyra Kinsey sent at 6/21/2017  2:14 PM CDT -----  Contact: wife  PLEASE CALL WIFE BACK TODAY WHEN YOU GET A CHANCE. SHE HAS SEVERAL QUESTIONS ABOUT PATIENTS CHEMO AND MEDICATION INSTRUCTIONS.

## 2017-06-22 ENCOUNTER — TELEPHONE (OUTPATIENT)
Dept: HEMATOLOGY/ONCOLOGY | Facility: CLINIC | Age: 62
End: 2017-06-22

## 2017-06-22 NOTE — TELEPHONE ENCOUNTER
Spoke with pt's wife to follow up on pt's diarrhea. Camelia MEDELLIN, recommended that the pt start taking Lomital for the symptoms.    Pt unable to get out to get the Rx as they are flooded in with the recent weather events in the are.    Pt's status has improved slightly and they will get the medicine as soon as they can.

## 2017-06-26 ENCOUNTER — OFFICE VISIT (OUTPATIENT)
Dept: HEMATOLOGY/ONCOLOGY | Facility: CLINIC | Age: 62
End: 2017-06-26
Payer: MEDICAID

## 2017-06-26 VITALS
SYSTOLIC BLOOD PRESSURE: 107 MMHG | WEIGHT: 148.81 LBS | HEART RATE: 102 BPM | TEMPERATURE: 98 F | BODY MASS INDEX: 19.63 KG/M2 | RESPIRATION RATE: 18 BRPM | DIASTOLIC BLOOD PRESSURE: 76 MMHG

## 2017-06-26 DIAGNOSIS — C18.4 MALIGNANT NEOPLASM OF TRANSVERSE COLON: ICD-10-CM

## 2017-06-26 DIAGNOSIS — D50.0 IRON DEFICIENCY ANEMIA DUE TO CHRONIC BLOOD LOSS: Primary | ICD-10-CM

## 2017-06-26 LAB
ALBUMIN SERPL-MCNC: 2.5 G/DL (ref 3.1–4.7)
ALP SERPL-CCNC: 954 IU/L (ref 40–104)
ALT (SGPT): 44 IU/L (ref 3–33)
AST SERPL-CCNC: 136 IU/L (ref 10–40)
BASOPHILS NFR BLD: 0 K/UL (ref 0–0.2)
BASOPHILS NFR BLD: 0.5 %
BILIRUB SERPL-MCNC: 1.6 MG/DL (ref 0.3–1)
BUN SERPL-MCNC: 12 MG/DL (ref 8–20)
CALCIUM SERPL-MCNC: 8.8 MG/DL (ref 7.7–10.4)
CEA: 3599.2 NG/ML
CHLORIDE: 97 MMOL/L (ref 98–110)
CO2 SERPL-SCNC: 27.9 MMOL/L (ref 22.8–31.6)
CREATININE: 1.06 MG/DL (ref 0.6–1.4)
EOSINOPHIL NFR BLD: 0 K/UL (ref 0–0.7)
EOSINOPHIL NFR BLD: 0.7 %
ERYTHROCYTE [DISTWIDTH] IN BLOOD BY AUTOMATED COUNT: 19.7 % (ref 12.5–14.5)
GLUCOSE: 106 MG/DL (ref 70–99)
GRAN #: 2 K/UL (ref 1.4–6.5)
GRAN%: 46.4 %
HCT VFR BLD AUTO: 33.2 % (ref 39–55)
HGB BLD-MCNC: 10.2 G/DL (ref 14–16)
IMMATURE GRANS (ABS): 0.3 K/UL (ref 0–1)
IMMATURE GRANULOCYTES: 6.4 %
LYMPH #: 0.7 K/UL (ref 1.2–3.4)
LYMPH%: 17.3 %
MCH RBC QN AUTO: 27.1 PG (ref 25–35)
MCHC RBC AUTO-ENTMCNC: 30.7 G/DL (ref 31–36)
MCV RBC AUTO: 88.3 FL (ref 80–100)
MONO #: 1.2 K/UL (ref 0.1–0.6)
MONO%: 28.7 %
NUCLEATED RBCS: 0 %
NUCLEATED RED BLOOD CELLS: 0 /100 WBC
PERFORMED BY:: ABNORMAL
PLATELET # BLD AUTO: 300 K/UL (ref 140–440)
PMV BLD AUTO: 7.9 FL (ref 8.8–12.7)
POTASSIUM SERPL-SCNC: 3.6 MMOL/L (ref 3.5–5)
PROT SERPL-MCNC: 6.3 G/DL (ref 6–8.2)
RBC # BLD AUTO: 3.76 M/UL (ref 4.3–5.9)
SODIUM: 137 MMOL/L (ref 134–144)
WBC # BLD: 4.2 K/UL (ref 5–10)

## 2017-06-26 PROCEDURE — 99214 OFFICE O/P EST MOD 30 MIN: CPT | Mod: ,,, | Performed by: INTERNAL MEDICINE

## 2017-06-26 NOTE — PROGRESS NOTES
"       [facility] HEME/ONC PROGRESS NOTE      Subjective:       Patient ID:   Sarbjit Ruiz  62 y.o. male.  1955      Chief Complaint:  Colon cancer follow-up    History of Present Illness:     Patient returns today for a regularly scheduled follow-up visit.   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  He has liver metastases, failure to thrive, chronic pain sx.    Nausea has been controlled, he is without mucositis or diarrhea. He complains of altered taste and weakness. He does not take his Megace but he does smoke marijuana to help with nausea control and try and increase his appetite. He denies pain, but admits to shortness of breath with exertion.    He has had some severe crampy abdominal pain earlier this past week,followed by "explosive" diarrhea. Symptoms have resolved, he has not had a BM in 2-3 days.    Clinically he appears depressed, he is on Lexapro 10 mg by mouth daily. He is agreeable to increasing the Lexapro to 20 mg by mouth daily.    I have discussed with him the option of giving him the second cycle of chemotherapy of FOLFOX Avastin inpatient as inpatient tomorrow versus comfort care measures with hospice. He would like to continue on the chemotherapy tomorrow. Baseline CEA has gone from 8000, down to 4600 range, supporting some response to the above regimen.    He had some persistent leukopenia and neutropenia on lab from  Last week, I held his treatment until this week to give time for him to try and get stronger, and to give time for neutropenia to resolve.Repeat CBC and CMP is being done today. We will admit him tomorrow for his second cycle of chemotherapy if blood counts are improved.                ROS:   GEN: normal without any fever, night sweats or weight loss  HEENT: normal with no HA's, sore throat, stiff neck, changes in vision  CV: normal with no CP, SOB, PND, HODGES or orthopnea  PULM: HODGES +., no SOB, cough, hemoptysis, sputum or pleuritic pain  GI: See HPI, Liver " metasteses. no abdominal pain, nausea, vomiting, constipation, diarrhea, melanotic stools, BRBPR, or hematemesis  : BPH.+.   normal with no hematuria, dysuria  BREAST: normal with no mass, discharge, pain  SKIN: normal with no rash, erythema, bruising, or swelling  Psych: depression hx      Hx hernia repair, T & A.      Allergies:  Review of patient's allergies indicates:  No Known Allergies    Medications:    Current Outpatient Prescriptions:     calcium carbonate (CALCIUM ANTACID) 300 mg (750 mg) Chew, Take 1 mg by mouth 2 (two) times daily as needed., Disp: , Rfl:     docusate sodium (COLACE) 100 MG capsule, Take 100 mg by mouth 2 (two) times daily., Disp: , Rfl:     escitalopram oxalate (LEXAPRO) 10 MG tablet, TK 1 T PO QD, Disp: , Rfl: 6    hydrocodone-acetaminophen 10-325mg (NORCO)  mg Tab, Take 1 tablet by mouth every 6 (six) hours as needed for Pain., Disp: , Rfl:     hydrocodone-acetaminophen 7.5-325mg (NORCO) 7.5-325 mg per tablet, TK 1 T PO Q 4 H PRN, Disp: , Rfl: 0    L. ACIDOPHILUS/BIFID. ANIMALIS (ONE-A-DAY TRUBIOTICS ORAL), Take by mouth., Disp: , Rfl:     ranitidine (ZANTAC) 300 MG capsule, Take 1 capsule (300 mg total) by mouth every evening., Disp: 30 capsule, Rfl: 3    alprazolam (XANAX) 0.25 MG tablet, TK 1 T PO Q 8 H PRN P, Disp: , Rfl: 0    ranitidine (ZANTAC) 300 MG tablet, Take 1 tablet (300 mg total) by mouth every evening., Disp: 30 tablet, Rfl: 5    tramadol (ULTRAM) 50 mg tablet, Take 50 mg by mouth every 6 (six) hours as needed for Pain., Disp: , Rfl:       Objective:     Vitals:  Blood pressure 107/76, pulse 102, temperature 97.5 °F (36.4 °C), temperature source Oral, resp. rate 18, weight 67.5 kg (148 lb 12.8 oz).    Physical Examination:   GEN: chronically ill in appearance., comfortable; AAOx3  HEAD: atraumatic and normocephalic  EYES: + ,  pallor, no icterus, PERRLA  ENT: OMM, no pharyngeal erythema, external ears WNL; no nasal discharge; no thrush  NECK: no  masses, thyroid normal, trachea midline, no LAD/LN's, supple  CV: RRR with no murmur; normal pulse; normal S1 and S2; no pedal edema  CHEST: Normal respiratory effort; CTAB; normal breath sounds; no wheeze or crackles  ABDOM: nontender and nondistended; soft; normal bowel sounds; no rebound/guarding, abdomenal wound healing  MUSC/Skeletal: ROM normal; no crepitus; joints normal; no deformities or arthropathy  EXTREM: no clubbing, cyanosis, inflammation or swelling  SKIN: no rashes, lesions, ulcers, petechiae or subcutaneous nodules  : no torre  NEURO: grossly intact; motor/sensory WNL; AAOx3; no tremors  PSYCH: normal mood, affect and behavior  LYMPH: normal cervical, supraclavicular, axillary and groin LN's          Labs:    CMP,CBC is pending. Baseline CEA in the 8,000 range,.on repeat determination CEA is improved at 4600.        Radiology/Diagnostic Studies:    CAT 3/2017  Liver metasteses +    Assessment/Plan:   (1) 62 y.o. male with diagnosis of stage 4 colon cancer, metastatic dx to liver.will go with course #2 of FOLFOX Avastin inpatient on Tuesday, June 27.    (2)Iron deficiency anemia, S/P ferrlicet infusion.CBC is pending from today, If significantly anemic, we will support him with packed red blood cells as needed.    (3)Discussed Folfox/ Avastin , give course 2 in hospital.       Watch for cold intolerance, peripheral neuropathy, wound complications.         (4) Admit for chemo. 6/27.            Discussion:     I have explained and the patient understands all of  the current recommendation(s). I have answered all of their questions to the best of my ability and to their complete satisfaction.   The patient is to continue with the current management plan.    RTC in  7/10.        Electronically signed by LUBNA Pelletier

## 2017-07-03 LAB
BASOPHILS NFR BLD: 0 K/UL (ref 0–0.2)
BASOPHILS NFR BLD: 0.4 %
EOSINOPHIL NFR BLD: 0 K/UL (ref 0–0.7)
EOSINOPHIL NFR BLD: 0.3 %
ERYTHROCYTE [DISTWIDTH] IN BLOOD BY AUTOMATED COUNT: 18.3 % (ref 12.5–14.5)
GRAN #: 6.5 K/UL (ref 1.4–6.5)
GRAN%: 86.7 %
HCT VFR BLD AUTO: 30.3 % (ref 39–55)
HGB BLD-MCNC: 9.4 G/DL (ref 14–16)
IMMATURE GRANS (ABS): 0 K/UL (ref 0–1)
IMMATURE GRANULOCYTES: 0.5 %
LYMPH #: 0.8 K/UL (ref 1.2–3.4)
LYMPH%: 10.4 %
MCH RBC QN AUTO: 27.1 PG (ref 25–35)
MCHC RBC AUTO-ENTMCNC: 31 G/DL (ref 31–36)
MCV RBC AUTO: 87.3 FL (ref 80–100)
MONO #: 0.1 K/UL (ref 0.1–0.6)
MONO%: 1.7 %
NUCLEATED RBCS: 0 %
NUCLEATED RED BLOOD CELLS: 0 /100 WBC
PERFORMED BY:: ABNORMAL
PLATELET # BLD AUTO: 171 K/UL (ref 140–440)
PMV BLD AUTO: 8.7 FL (ref 8.8–12.7)
RBC # BLD AUTO: 3.47 M/UL (ref 4.3–5.9)
WBC # BLD: 7.5 K/UL (ref 5–10)

## 2017-07-05 DIAGNOSIS — K52.1 DIARRHEA DUE TO DRUG: Primary | ICD-10-CM

## 2017-07-05 NOTE — TELEPHONE ENCOUNTER
Pt wife called again today stating that pt is having the explosive diarrhea again.     Call the Rx in to the [facility] in [place] toño/ Antonieta the pharm on duty.    Lomotil 1po q d prn #30 with 1 refill.

## 2017-07-06 RX ORDER — DIPHENOXYLATE HYDROCHLORIDE AND ATROPINE SULFATE 2.5; .025 MG/1; MG/1
1 TABLET ORAL 4 TIMES DAILY PRN
Qty: 40 TABLET | Refills: 0 | Status: SHIPPED | OUTPATIENT
Start: 2017-07-06 | End: 2017-07-16

## 2017-07-06 NOTE — TELEPHONE ENCOUNTER
Spoke with william Alicia they were able to p/u script at [facility] for [facility]

## 2017-07-06 NOTE — TELEPHONE ENCOUNTER
Loretta, I put an Rx for Lomotil in [facility] on Sarbjit Ruiz, it is supposed to be printed.  Call it in for him to stiven,  Call pt when done.  Let me know, any problems?

## 2017-07-10 ENCOUNTER — HISTORICAL (OUTPATIENT)
Dept: ADMINISTRATIVE | Facility: HOSPITAL | Age: 62
End: 2017-07-10

## 2017-07-10 ENCOUNTER — OFFICE VISIT (OUTPATIENT)
Dept: HEMATOLOGY/ONCOLOGY | Facility: CLINIC | Age: 62
End: 2017-07-10
Payer: MEDICAID

## 2017-07-10 VITALS
DIASTOLIC BLOOD PRESSURE: 64 MMHG | WEIGHT: 147.81 LBS | RESPIRATION RATE: 18 BRPM | HEART RATE: 97 BPM | BODY MASS INDEX: 19.5 KG/M2 | SYSTOLIC BLOOD PRESSURE: 91 MMHG | TEMPERATURE: 98 F

## 2017-07-10 DIAGNOSIS — C18.4 MALIGNANT NEOPLASM OF TRANSVERSE COLON: ICD-10-CM

## 2017-07-10 DIAGNOSIS — C18.9 COLON CANCER METASTASIZED TO LIVER: Primary | ICD-10-CM

## 2017-07-10 DIAGNOSIS — D50.0 IRON DEFICIENCY ANEMIA SECONDARY TO BLOOD LOSS (CHRONIC): ICD-10-CM

## 2017-07-10 DIAGNOSIS — C78.7 COLON CANCER METASTASIZED TO LIVER: Primary | ICD-10-CM

## 2017-07-10 LAB
BASOPHILS NFR BLD: 0 K/UL (ref 0–0.2)
BASOPHILS NFR BLD: 0.4 %
EOSINOPHIL NFR BLD: 0 K/UL (ref 0–0.7)
EOSINOPHIL NFR BLD: 0.6 %
ERYTHROCYTE [DISTWIDTH] IN BLOOD BY AUTOMATED COUNT: 19.5 % (ref 12.5–14.5)
GRAN #: 3.3 K/UL (ref 1.4–6.5)
GRAN%: 70.1 %
HCT VFR BLD AUTO: 32.1 % (ref 39–55)
HGB BLD-MCNC: 9.7 G/DL (ref 14–16)
IMMATURE GRANS (ABS): 0 K/UL (ref 0–1)
IMMATURE GRANULOCYTES: 0.2 %
LYMPH #: 0.8 K/UL (ref 1.2–3.4)
LYMPH%: 17.2 %
MCH RBC QN AUTO: 27.4 PG (ref 25–35)
MCHC RBC AUTO-ENTMCNC: 30.2 G/DL (ref 31–36)
MCV RBC AUTO: 90.7 FL (ref 80–100)
MONO #: 0.5 K/UL (ref 0.1–0.6)
MONO%: 11.5 %
NUCLEATED RBCS: 0 %
NUCLEATED RED BLOOD CELLS: 0 /100 WBC
PERFORMED BY:: ABNORMAL
PLATELET # BLD AUTO: 126 K/UL (ref 140–440)
PMV BLD AUTO: 9.3 FL (ref 8.8–12.7)
RBC # BLD AUTO: 3.54 M/UL (ref 4.3–5.9)
WBC # BLD: 4.7 K/UL (ref 5–10)

## 2017-07-10 PROCEDURE — 99214 OFFICE O/P EST MOD 30 MIN: CPT | Mod: ,,, | Performed by: INTERNAL MEDICINE

## 2017-07-10 RX ORDER — ENOXAPARIN SODIUM 100 MG/ML
INJECTION SUBCUTANEOUS
Refills: 4 | COMMUNITY
Start: 2017-07-04

## 2017-07-11 NOTE — PROGRESS NOTES
[facility] HEME/ONC PROGRESS NOTE      Subjective:       Patient ID:   Sarbjit Ruiz  62 y.o. male.  1955                          Cristal Black      Chief Complaint:  Colon cancer follow-up    History of Present Illness:     Patient returns today for a regularly scheduled follow-up visit.   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  He has liver metastases, failure to thrive, chronic pain sx.    Nausea has been controlled, he is without mucositis or diarrhea. He complains of altered taste and weakness. He does not take his Megace but he does smoke marijuana to help with nausea control and try and increase his appetite. He denies pain, but admits to shortness of breath with exertion.    He has had some diarrhea, but not as much as with previous cycles.  Feeling stronger, appears stronger.    Clinically he appears to have decreased depression sx,, he is on Lexapro to 20 mg by mouth daily.    Await return of GI sx to NL, and lab pending.  D1C3 likely 7/18-20/17.  CEA better 3,600.  Check CAT of liver after # 4.    ROS:   GEN: normal without any fever, night sweats or weight loss  HEENT: normal with no HA's, sore throat, stiff neck, changes in vision  CV: normal with no CP, SOB, PND, HODGES or orthopnea  PULM: HODGES +., no SOB, cough, hemoptysis, sputum or pleuritic pain  GI: See HPI, Liver metasteses. no abdominal pain, nausea, vomiting, constipation, diarrhea, melanotic stools, BRBPR, or hematemesis  : BPH.+.   normal with no hematuria, dysuria  BREAST: normal with no mass, discharge, pain  SKIN: normal with no rash, erythema, bruising, or swelling  Psych: depression hx      Hx hernia repair, T & A.      Allergies:  Review of patient's allergies indicates:  No Known Allergies    Medications:    Current Outpatient Prescriptions:     alprazolam (XANAX) 0.25 MG tablet, TK 1 T PO Q 8 H PRN P, Disp: , Rfl: 0    calcium carbonate (CALCIUM ANTACID) 300 mg (750 mg) Chew, Take 1 mg by mouth 2 (two) times  daily as needed., Disp: , Rfl:     DIPHENOXYLATE HCL/ATROPINE (LOMOTIL ORAL), Take 1 tablet by mouth once daily., Disp: , Rfl:     diphenoxylate-atropine 2.5-0.025 mg (LOMOTIL) 2.5-0.025 mg per tablet, Take 1 tablet by mouth 4 (four) times daily as needed for Diarrhea., Disp: 40 tablet, Rfl: 0    docusate sodium (COLACE) 100 MG capsule, Take 100 mg by mouth 2 (two) times daily., Disp: , Rfl:     enoxaparin (LOVENOX) 40 mg/0.4 mL Syrg, INJECT 40 MG SUBCUTANEOUSLY Q 12 H, Disp: , Rfl: 4    escitalopram oxalate (LEXAPRO) 10 MG tablet, TK 1 T PO QD, Disp: , Rfl: 6    hydrocodone-acetaminophen 10-325mg (NORCO)  mg Tab, Take 1 tablet by mouth every 6 (six) hours as needed for Pain., Disp: , Rfl:     hydrocodone-acetaminophen 7.5-325mg (NORCO) 7.5-325 mg per tablet, TK 1 T PO Q 4 H PRN, Disp: , Rfl: 0    L. ACIDOPHILUS/BIFID. ANIMALIS (ONE-A-DAY TRUBIOTICS ORAL), Take by mouth., Disp: , Rfl:     ranitidine (ZANTAC) 300 MG capsule, Take 1 capsule (300 mg total) by mouth every evening., Disp: 30 capsule, Rfl: 3    tramadol (ULTRAM) 50 mg tablet, Take 50 mg by mouth every 6 (six) hours as needed for Pain., Disp: , Rfl:     ranitidine (ZANTAC) 300 MG tablet, Take 1 tablet (300 mg total) by mouth every evening., Disp: 30 tablet, Rfl: 5      Objective:     Vitals:  Blood pressure 91/64, pulse 97, temperature 98 °F (36.7 °C), temperature source Oral, resp. rate 18, weight 67 kg (147 lb 12.8 oz).    Physical Examination:   GEN: chronically ill in appearance., comfortable; AAOx3  HEAD: atraumatic and normocephalic  EYES: + ,  pallor, no icterus, PERRLA  ENT: OMM, no pharyngeal erythema, external ears WNL; no nasal discharge; no thrush  NECK: no masses, thyroid normal, trachea midline, no LAD/LN's, supple  CV: RRR with no murmur; normal pulse; normal S1 and S2; no pedal edema  CHEST: Normal respiratory effort; CTAB; normal breath sounds; no wheeze or crackles  ABDOM: nontender and nondistended; soft; normal bowel  sounds; no rebound/guarding, abdomenal wound healing  MUSC/Skeletal: ROM normal; no crepitus; joints normal; no deformities or arthropathy  EXTREM: no clubbing, cyanosis, inflammation or swelling  SKIN: no rashes, lesions, ulcers, petechiae or subcutaneous nodules  : no torre  NEURO: grossly intact; motor/sensory WNL; AAOx3; no tremors  PSYCH: normal mood, affect and behavior  LYMPH: normal cervical, supraclavicular, axillary and groin LN's          Labs:    CMP,CBC is pending. Baseline CEA in the 8,000 range,.on repeat determination CEA is improved at 4600. And now better at 3,600.        Radiology/Diagnostic Studies:    CAT 3/2017  Liver metasteses +.  Recheck CAT after #4.    Assessment/Plan:   (1) 62 y.o. male with diagnosis of stage 4 colon cancer, metastatic dx to liver.will go with course #3 of FOLFOX Avastin inpatient on Tuesday, July 18.    (2)Iron deficiency anemia, S/P ferrlicet infusion.CBC is pending from today, If significantly anemic, we will support him with packed red blood cells as needed.    (3) Refer to Dr. Gonzales after 4 cycles of chemo, for Y90 eval?       Watch for cold intolerance, peripheral neuropathy, wound complications.                   Discussion:     I have explained and the patient understands all of  the current recommendation(s). I have answered all of their questions to the best of my ability and to their complete satisfaction.   The patient is to continue with the current management plan.    RTC in 2 weeks.        Electronically signed by LUBNA Pelletier

## 2017-07-16 ENCOUNTER — TELEPHONE (OUTPATIENT)
Dept: HEMATOLOGY/ONCOLOGY | Facility: CLINIC | Age: 62
End: 2017-07-16

## 2017-07-16 RX ORDER — HEPARIN 100 UNIT/ML
500 SYRINGE INTRAVENOUS
Status: CANCELLED | OUTPATIENT
Start: 2017-07-16

## 2017-07-16 RX ORDER — FLUOROURACIL 50 MG/ML
400 INJECTION, SOLUTION INTRAVENOUS
Status: CANCELLED | OUTPATIENT
Start: 2017-07-16

## 2017-07-16 RX ORDER — FLUOROURACIL 50 MG/ML
400 INJECTION, SOLUTION INTRAVENOUS
Status: CANCELLED | OUTPATIENT
Start: 2017-07-18

## 2017-07-16 RX ORDER — SODIUM CHLORIDE 0.9 % (FLUSH) 0.9 %
10 SYRINGE (ML) INJECTION
Status: CANCELLED | OUTPATIENT
Start: 2017-07-16

## 2017-07-16 RX ORDER — HEPARIN 100 UNIT/ML
500 SYRINGE INTRAVENOUS
Status: CANCELLED | OUTPATIENT
Start: 2017-07-20

## 2017-07-16 RX ORDER — SODIUM CHLORIDE 0.9 % (FLUSH) 0.9 %
10 SYRINGE (ML) INJECTION
Status: CANCELLED | OUTPATIENT
Start: 2017-07-20

## 2017-07-17 ENCOUNTER — TELEPHONE (OUTPATIENT)
Dept: HEMATOLOGY/ONCOLOGY | Facility: CLINIC | Age: 62
End: 2017-07-17

## 2017-07-17 VITALS — BODY MASS INDEX: 19.61 KG/M2 | HEIGHT: 73 IN | WEIGHT: 148 LBS

## 2017-07-17 LAB
BASOPHILS NFR BLD: 0 K/UL (ref 0–0.2)
BASOPHILS NFR BLD: 0.3 %
EOSINOPHIL NFR BLD: 0.1 K/UL (ref 0–0.7)
EOSINOPHIL NFR BLD: 1.7 %
ERYTHROCYTE [DISTWIDTH] IN BLOOD BY AUTOMATED COUNT: 19.9 % (ref 12.5–14.5)
GRAN #: 1.4 K/UL (ref 1.4–6.5)
GRAN%: 47.3 %
HCT VFR BLD AUTO: 34.2 % (ref 39–55)
HGB BLD-MCNC: 10.7 G/DL (ref 14–16)
IMMATURE GRANS (ABS): 0 K/UL (ref 0–1)
IMMATURE GRANULOCYTES: 0.7 %
LYMPH #: 0.7 K/UL (ref 1.2–3.4)
LYMPH%: 23.1 %
MCH RBC QN AUTO: 28.7 PG (ref 25–35)
MCHC RBC AUTO-ENTMCNC: 31.3 G/DL (ref 31–36)
MCV RBC AUTO: 91.7 FL (ref 80–100)
MONO #: 0.8 K/UL (ref 0.1–0.6)
MONO%: 26.9 %
NUCLEATED RBCS: 0 %
NUCLEATED RED BLOOD CELLS: 0 /100 WBC
PERFORMED BY:: ABNORMAL
PLATELET # BLD AUTO: 173 K/UL (ref 140–440)
PMV BLD AUTO: 8.4 FL (ref 8.8–12.7)
RBC # BLD AUTO: 3.73 M/UL (ref 4.3–5.9)
WBC # BLD: 2.9 K/UL (ref 5–10)

## 2017-07-17 RX ORDER — FLUOROURACIL 50 MG/ML
400 INJECTION, SOLUTION INTRAVENOUS ONCE
Status: CANCELLED | OUTPATIENT
Start: 2017-07-18

## 2017-07-17 NOTE — TELEPHONE ENCOUNTER
Pts wife states that he still takes a Lomotil once a day and that keeps bowels in check with just one loose stool in AM but not diarrhea. Instructed chemo scheduled for 10;00 AM on 7/18 and per Ludmila pt does not need auth.

## 2017-07-18 ENCOUNTER — TELEPHONE (OUTPATIENT)
Dept: HEMATOLOGY/ONCOLOGY | Facility: CLINIC | Age: 62
End: 2017-07-18

## 2017-07-18 DIAGNOSIS — C18.9 MALIGNANT NEOPLASM OF COLON, UNSPECIFIED PART OF COLON: Primary | ICD-10-CM

## 2017-07-18 LAB
ALBUMIN SERPL-MCNC: 2.1 G/DL (ref 3.1–4.7)
ALP SERPL-CCNC: 387 IU/L (ref 40–104)
ALT (SGPT): 10 IU/L (ref 3–33)
AST SERPL-CCNC: 31 IU/L (ref 10–40)
BILIRUB SERPL-MCNC: 0.6 MG/DL (ref 0.3–1)
BUN SERPL-MCNC: 8 MG/DL (ref 8–20)
CALCIUM SERPL-MCNC: 8.6 MG/DL (ref 7.7–10.4)
CHLORIDE: 101 MMOL/L (ref 98–110)
CO2 SERPL-SCNC: 29.3 MMOL/L (ref 22.8–31.6)
CREATININE: 0.73 MG/DL (ref 0.6–1.4)
GLUCOSE: 104 MG/DL (ref 70–99)
POTASSIUM SERPL-SCNC: 3.5 MMOL/L (ref 3.5–5)
PROT SERPL-MCNC: 5.1 G/DL (ref 6–8.2)
SODIUM: 138 MMOL/L (ref 134–144)

## 2017-07-19 ENCOUNTER — TELEPHONE (OUTPATIENT)
Dept: HEMATOLOGY/ONCOLOGY | Facility: CLINIC | Age: 62
End: 2017-07-19

## 2017-07-19 DIAGNOSIS — T45.1X5A CHEMOTHERAPY-INDUCED NEUTROPENIA: ICD-10-CM

## 2017-07-19 DIAGNOSIS — D70.1 CHEMOTHERAPY-INDUCED NEUTROPENIA: ICD-10-CM

## 2017-07-19 DIAGNOSIS — C78.7 ADENOCARCINOMA OF COLON METASTATIC TO LIVER: Primary | ICD-10-CM

## 2017-07-19 DIAGNOSIS — C18.9 ADENOCARCINOMA OF COLON METASTATIC TO LIVER: Primary | ICD-10-CM

## 2017-07-24 LAB
BASOPHILS NFR BLD: 0 K/UL (ref 0–0.2)
BASOPHILS NFR BLD: 0.2 %
EOSINOPHIL NFR BLD: 0 K/UL (ref 0–0.7)
EOSINOPHIL NFR BLD: 0.7 %
ERYTHROCYTE [DISTWIDTH] IN BLOOD BY AUTOMATED COUNT: 18.1 % (ref 12.5–14.5)
GRAN #: 3.3 K/UL (ref 1.4–6.5)
GRAN%: 76.2 %
HCT VFR BLD AUTO: 35 % (ref 39–55)
HGB BLD-MCNC: 11.1 G/DL (ref 14–16)
IMMATURE GRANS (ABS): 0 K/UL (ref 0–1)
IMMATURE GRANULOCYTES: 0.7 %
LYMPH #: 0.8 K/UL (ref 1.2–3.4)
LYMPH%: 18 %
MCH RBC QN AUTO: 28.4 PG (ref 25–35)
MCHC RBC AUTO-ENTMCNC: 31.7 G/DL (ref 31–36)
MCV RBC AUTO: 89.5 FL (ref 80–100)
MONO #: 0.2 K/UL (ref 0.1–0.6)
MONO%: 4.2 %
NUCLEATED RBCS: 0 %
NUCLEATED RED BLOOD CELLS: 0 /100 WBC
PERFORMED BY:: ABNORMAL
PLATELET # BLD AUTO: 106 K/UL (ref 140–440)
PMV BLD AUTO: 8.6 FL (ref 8.8–12.7)
RBC # BLD AUTO: 3.91 M/UL (ref 4.3–5.9)
WBC # BLD: 4.3 K/UL (ref 5–10)

## 2017-07-25 ENCOUNTER — OFFICE VISIT (OUTPATIENT)
Dept: HEMATOLOGY/ONCOLOGY | Facility: CLINIC | Age: 62
End: 2017-07-25
Payer: MEDICAID

## 2017-07-25 VITALS
WEIGHT: 144.13 LBS | HEART RATE: 80 BPM | SYSTOLIC BLOOD PRESSURE: 113 MMHG | RESPIRATION RATE: 18 BRPM | BODY MASS INDEX: 19.1 KG/M2 | DIASTOLIC BLOOD PRESSURE: 77 MMHG | HEIGHT: 73 IN | TEMPERATURE: 97 F

## 2017-07-25 DIAGNOSIS — T45.1X5A CHEMOTHERAPY-INDUCED NEUTROPENIA: ICD-10-CM

## 2017-07-25 DIAGNOSIS — C78.7 ADENOCARCINOMA OF COLON METASTATIC TO LIVER: Primary | ICD-10-CM

## 2017-07-25 DIAGNOSIS — D50.0 IRON DEFICIENCY ANEMIA SECONDARY TO BLOOD LOSS (CHRONIC): ICD-10-CM

## 2017-07-25 DIAGNOSIS — C18.9 ADENOCARCINOMA OF COLON METASTATIC TO LIVER: Primary | ICD-10-CM

## 2017-07-25 DIAGNOSIS — D70.1 CHEMOTHERAPY-INDUCED NEUTROPENIA: ICD-10-CM

## 2017-07-25 PROCEDURE — 99214 OFFICE O/P EST MOD 30 MIN: CPT | Mod: ,,, | Performed by: INTERNAL MEDICINE

## 2017-07-25 NOTE — LETTER
July 25, 2017      Gianfranco Bruce MD  [address] [facility] [address]           [facility] Hematology Oncology  [address]  [address]  [place] [address]  Phone: [phone]  Fax: [phone]          Patient: Sarbjit Ruiz   MR Number: [id number]   YOB: 1955   Date of Visit: 7/25/2017       Dear Dr. Gianfranco Bruce:    Thank you for referring Sarbjit Ruiz to me for evaluation. Attached you will find relevant portions of my assessment and plan of care.    If you have questions, please do not hesitate to call me. I look forward to following Sarbjit Ruiz along with you.    Sincerely,    LUBNA Pelletier MD    Enclosure  CC:  No Recipients    If you would like to receive this communication electronically, please contact [email or web address] or [phone] to request more information on [employer] access.    For providers and/or their staff who would like to refer a patient to [facility], please contact us through our one-stop-shop provider referral line, [facility] , at [phone].    If you feel you have received this communication in error or would no longer like to receive these types of communications, please e-mail [email or web address]

## 2017-07-25 NOTE — PROGRESS NOTES
[facility] HEME/ONC PROGRESS NOTE      Subjective:       Patient ID:   Sarbjit Ruiz  62 y.o. male.  1955                          Cristal Black      Chief Complaint:  Colon cancer follow-up    History of Present Illness:     Patient returns today for a regularly scheduled follow-up visit.   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  He has liver metastases, failure to thrive, chronic pain sx.    He has completed 3 courses of FOLFOX and Avastin. Nausea has not been a problem. He smokes marijuana to help with nausea control. He does admit to having some mucositis with mouth ulcers at the lip. Appetite is improved. He does not take his Megace but he does smoke marijuana to help with nausea control and try and increase his appetite. He denies pain, but admits to shortness of breath with exertion.    He is having 1 soft BM daily and takes his Lomotil daily.    Clinically he appears to have decreased depression sx,, he is on Lexapro to 20 mg by mouth daily.    We have been giving him the chemotherapy every 3 weeks, would like to try him on an every 2 week schedule if mucositis resolves, GI symptoms remain stable, and leukopenia improves. He is on Neupogen daily ×4 days and follow-up CBC will be checked on Monday    Await return of GI sx to NL, and lab pending.  D1C4is due on August 1.  CEA better 3,600.  Check CAT of liver after # 4.    ROS:   GEN: normal without any fever, night sweats or weight loss  HEENT: normal with no HA's, sore throat, stiff neck, changes in vision.e does admit to some mucositis secondary to chemotherapy.  CV: normal with no CP, SOB, PND, HODGES or orthopnea  PULM: HODGES +., no SOB, cough, hemoptysis, sputum or pleuritic pain  GI: See HPI, Liver metasteses. no abdominal pain, nausea, vomiting, constipation, diarrhea, melanotic stools, BRBPR, or hematemesis  : BPH.+.   normal with no hematuria, dysuria  BREAST: normal with no mass, discharge, pain  SKIN: normal with no rash,  "erythema, bruising, or swelling  Psych: depression hx      Hx hernia repair, T & A.      Allergies:  Review of patient's allergies indicates:  No Known Allergies    Medications:    Current Outpatient Prescriptions:     calcium carbonate (CALCIUM ANTACID) 300 mg (750 mg) Chew, Take 1 mg by mouth 2 (two) times daily as needed., Disp: , Rfl:     DIPHENOXYLATE HCL/ATROPINE (LOMOTIL ORAL), Take 1 tablet by mouth once daily., Disp: , Rfl:     enoxaparin (LOVENOX) 40 mg/0.4 mL Syrg, INJECT 40 MG SUBCUTANEOUSLY Q 12 H, Disp: , Rfl: 4    escitalopram oxalate (LEXAPRO) 10 MG tablet, TK 1 T PO QD, Disp: , Rfl: 6    L. ACIDOPHILUS/BIFID. ANIMALIS (ONE-A-DAY TRUBIOTICS ORAL), Take by mouth., Disp: , Rfl:     ranitidine (ZANTAC) 300 MG capsule, Take 1 capsule (300 mg total) by mouth every evening., Disp: 30 capsule, Rfl: 3    alprazolam (XANAX) 0.25 MG tablet, TK 1 T PO Q 8 H PRN P, Disp: , Rfl: 0    ranitidine (ZANTAC) 300 MG tablet, Take 1 tablet (300 mg total) by mouth every evening., Disp: 30 tablet, Rfl: 5    tramadol (ULTRAM) 50 mg tablet, Take 50 mg by mouth every 6 (six) hours as needed for Pain., Disp: , Rfl:       Objective:     Vitals:  Blood pressure 113/77, pulse 80, temperature 97.4 °F (36.3 °C), resp. rate 18, height 6' 1" (1.854 m), weight 65.4 kg (144 lb 1.6 oz).    Physical Examination:   GEN: chronically ill in appearance., comfortable; AAOx3  HEAD: atraumatic and normocephalic  EYES: + ,  pallor, no icterus, PERRLA  ENT: OMM, no pharyngeal erythema, small area of ulceration noted at the lower inner lip, no thrush  NECK: no masses, thyroid normal, no LAD/LN's, supple  CV: RRR with no murmur; normal pulse  CHEST: Normal respiratory effort; CTAB; normal breath sounds; no wheeze or crackles  ABDOM: nontender and nondistended; soft; normal bowel sounds; no rebound/guarding, abdomenal wound healing  MUSC/Skeletal: ROM normal; no crepitus; joints normal  EXTREM: no clubbing, cyanosis, inflammation or " swelling.left and right calf are nontender without palpable venous cord  SKIN: no rashes, lesions, ulcers, petechiae   : no torre  NEURO: grossly intact; motor/sensory WNL; AAOx3; no tremors  PSYCH: normal mood, affect and behavior  LYMPH: normal cervical, supraclavicular, axillary and groin LN's          Labs:    CMP,CBC is pending. Baseline CEA in the 8,000 range,.on repeat determination CEA is improved at 4600. And now better at 3,600.  Due for follow-up lab on July 31        Radiology/Diagnostic Studies:    CAT 3/2017  Liver metasteses +.  Recheck CAT after #4.    Assessment/Plan:   (1) 62 y.o. male with diagnosis of stage 4 colon cancer, metastatic dx to liver.will go with course #4 of FOLFOX Avastin inpatient on Tuesday, august 1    (2)Iron deficiency anemia, S/P ferrlicet infusion.    (3) Refer to Dr. Gonzales after 4 cycles of chemo, for Y90 eval?       Watch for cold intolerance, peripheral neuropathy, wound complications.                   Discussion:     I have explained and the patient understands all of  the current recommendation(s). I have answered all of their questions to the best of my ability and to their complete satisfaction.   The patient is to continue with the current management plan.    RTC in 2 weeks.        Electronically signed by LUBNA Pelletier

## 2017-07-26 ENCOUNTER — OFFICE VISIT (OUTPATIENT)
Dept: FAMILY MEDICINE | Facility: CLINIC | Age: 62
End: 2017-07-26
Payer: MEDICAID

## 2017-07-26 VITALS
TEMPERATURE: 97 F | HEIGHT: 73 IN | DIASTOLIC BLOOD PRESSURE: 70 MMHG | RESPIRATION RATE: 20 BRPM | WEIGHT: 144.13 LBS | BODY MASS INDEX: 19.1 KG/M2 | HEART RATE: 84 BPM | SYSTOLIC BLOOD PRESSURE: 100 MMHG

## 2017-07-26 DIAGNOSIS — C18.9 COLON CANCER METASTASIZED TO LIVER: Primary | ICD-10-CM

## 2017-07-26 DIAGNOSIS — C78.7 COLON CANCER METASTASIZED TO LIVER: Primary | ICD-10-CM

## 2017-07-26 PROCEDURE — 99386 PREV VISIT NEW AGE 40-64: CPT | Mod: ,,, | Performed by: FAMILY MEDICINE

## 2017-07-26 RX ORDER — ALPRAZOLAM 0.25 MG/1
TABLET ORAL
Qty: 60 TABLET | Refills: 0 | Status: SHIPPED | OUTPATIENT
Start: 2017-07-26

## 2017-07-26 RX ORDER — HYDROCODONE BITARTRATE AND ACETAMINOPHEN 7.5; 325 MG/1; MG/1
1 TABLET ORAL EVERY 6 HOURS PRN
Qty: 60 TABLET | Refills: 0 | Status: SHIPPED | OUTPATIENT
Start: 2017-07-26

## 2017-07-26 RX ORDER — ESCITALOPRAM OXALATE 20 MG/1
20 TABLET ORAL DAILY
COMMUNITY

## 2017-07-26 NOTE — PROGRESS NOTES
Subjective:       Patient ID: Sarbjit Ruiz is a 62 y.o. male.    Chief Complaint: Annual Exam (establish pcp)    It establish primary care physician has previously been followed by Dr. Pelletier for his cancer-related issues. Dx with 3/23, colon resection April 10 x 5 dd. Portacath placed 4/23/17.  Has lost 65 lb weight loss.  DVT in right groin.  On neupogen injections      Review of Systems   Constitutional: Positive for unexpected weight change (65 lb weight loss).   HENT: Positive for nosebleeds.    Cardiovascular: Positive for leg swelling (resolved).   Gastrointestinal: Positive for abdominal pain, diarrhea and nausea.   Genitourinary:        Abd pain weight loss and diarrhea.   Psychiatric/Behavioral: Positive for confusion and suicidal ideas.       Objective:      Physical Exam   Constitutional: He is oriented to person, place, and time. He appears well-developed and well-nourished.  Non-toxic appearance. He does not have a sickly appearance. He does not appear ill. No distress.   HENT:   Head: Normocephalic and atraumatic.   Right Ear: External ear normal.   Left Ear: External ear normal.   Nose: Nose normal.   Mouth/Throat: Oropharynx is clear and moist. No oropharyngeal exudate.   Eyes: Conjunctivae and EOM are normal. Pupils are equal, round, and reactive to light. Right eye exhibits no discharge. Left eye exhibits no discharge. No scleral icterus.   Neck: Normal range of motion. Neck supple. No JVD present. No tracheal deviation present. No thyromegaly present.   Cardiovascular: Normal rate, normal heart sounds and intact distal pulses.  Exam reveals no gallop and no friction rub.    No murmur heard.  Pulmonary/Chest: Effort normal and breath sounds normal. No stridor. No respiratory distress. He has no wheezes. He has no rales. He exhibits no tenderness.   Abdominal: Soft. Bowel sounds are normal. He exhibits no distension and no mass. There is no tenderness. There is no rebound and no guarding. No  hernia.   Genitourinary: Rectum normal, prostate normal, testes normal and penis normal. Rectal exam shows guaiac negative stool. Cremasteric reflex is present. Right testis shows no mass, no swelling and no tenderness. Right testis is descended. Cremasteric reflex is not absent on the right side. Left testis shows no mass, no swelling and no tenderness. Left testis is descended. Cremasteric reflex is not absent on the left side. Circumcised. No phimosis, paraphimosis, hypospadias, penile erythema or penile tenderness. No discharge found.   Musculoskeletal: He exhibits no edema, tenderness or deformity.   Lymphadenopathy:     He has no cervical adenopathy.   Neurological: He is alert and oriented to person, place, and time. He has normal reflexes. He displays normal reflexes. No cranial nerve deficit. He exhibits normal muscle tone. Coordination normal.   Skin: Skin is warm and dry. No rash noted. He is not diaphoretic. No erythema. No pallor.   Psychiatric: He has a normal mood and affect. His behavior is normal. Judgment and thought content normal.   Nursing note and vitals reviewed.      Assessment:       1. Colon cancer metastasized to liver        Plan:       Sarbjit was seen today for annual exam.    Diagnoses and all orders for this visit:    Colon cancer metastasized to liver  -     alprazolam (XANAX) 0.25 MG tablet; TK 1 T PO Q 8 H PRN P  -     hydrocodone-acetaminophen 7.5-325mg (NORCO) 7.5-325 mg per tablet; Take 1 tablet by mouth every 6 (six) hours as needed for Pain.  -     Ambulatory referral to Home Health         Return in about 3 months (around 10/26/2017).

## 2017-07-31 LAB
ALBUMIN SERPL-MCNC: 2.7 G/DL (ref 3.1–4.7)
ALP SERPL-CCNC: 404 IU/L (ref 40–104)
ALT (SGPT): 9 IU/L (ref 3–33)
AST SERPL-CCNC: 37 IU/L (ref 10–40)
BASOPHILS NFR BLD: 0 K/UL (ref 0–0.2)
BASOPHILS NFR BLD: 0.7 %
BILIRUB SERPL-MCNC: 0.7 MG/DL (ref 0.3–1)
BUN SERPL-MCNC: 5 MG/DL (ref 8–20)
CALCIUM SERPL-MCNC: 8.7 MG/DL (ref 7.7–10.4)
CHLORIDE: 101 MMOL/L (ref 98–110)
CO2 SERPL-SCNC: 30.9 MMOL/L (ref 22.8–31.6)
CREATININE: 0.8 MG/DL (ref 0.6–1.4)
EOSINOPHIL NFR BLD: 0 K/UL (ref 0–0.7)
EOSINOPHIL NFR BLD: 0.7 %
ERYTHROCYTE [DISTWIDTH] IN BLOOD BY AUTOMATED COUNT: 19.3 % (ref 12.5–14.5)
GLUCOSE: 111 MG/DL (ref 70–99)
GRAN #: 1.6 K/UL (ref 1.4–6.5)
GRAN%: 52.4 %
HCT VFR BLD AUTO: 37.8 % (ref 39–55)
HGB BLD-MCNC: 11.6 G/DL (ref 14–16)
IMMATURE GRANS (ABS): 0.1 K/UL (ref 0–1)
IMMATURE GRANULOCYTES: 3.4 %
LYMPH #: 0.8 K/UL (ref 1.2–3.4)
LYMPH%: 25.6 %
MCH RBC QN AUTO: 28.4 PG (ref 25–35)
MCHC RBC AUTO-ENTMCNC: 30.7 G/DL (ref 31–36)
MCV RBC AUTO: 92.4 FL (ref 80–100)
MONO #: 0.5 K/UL (ref 0.1–0.6)
MONO%: 17.2 %
NUCLEATED RBCS: 0 %
NUCLEATED RED BLOOD CELLS: 0 /100 WBC
PERFORMED BY:: ABNORMAL
PLATELET # BLD AUTO: 51 K/UL (ref 140–440)
PMV BLD AUTO: 8.9 FL (ref 8.8–12.7)
POTASSIUM SERPL-SCNC: 3.1 MMOL/L (ref 3.5–5)
PROT SERPL-MCNC: 6.3 G/DL (ref 6–8.2)
RBC # BLD AUTO: 4.09 M/UL (ref 4.3–5.9)
SODIUM: 142 MMOL/L (ref 134–144)
WBC # BLD: 3 K/UL (ref 5–10)

## 2017-08-07 LAB
ALBUMIN SERPL-MCNC: 2.5 G/DL (ref 3.1–4.7)
ALP SERPL-CCNC: 315 IU/L (ref 40–104)
ALT (SGPT): 8 IU/L (ref 3–33)
AST SERPL-CCNC: 27 IU/L (ref 10–40)
BASOPHILS NFR BLD: 0 K/UL (ref 0–0.2)
BASOPHILS NFR BLD: 0.3 %
BILIRUB SERPL-MCNC: 0.7 MG/DL (ref 0.3–1)
BUN SERPL-MCNC: 9 MG/DL (ref 8–20)
CALCIUM SERPL-MCNC: 8.7 MG/DL (ref 7.7–10.4)
CHLORIDE: 96 MMOL/L (ref 98–110)
CO2 SERPL-SCNC: 28.4 MMOL/L (ref 22.8–31.6)
CREATININE: 0.73 MG/DL (ref 0.6–1.4)
EOSINOPHIL NFR BLD: 0 K/UL (ref 0–0.7)
EOSINOPHIL NFR BLD: 0.5 %
ERYTHROCYTE [DISTWIDTH] IN BLOOD BY AUTOMATED COUNT: 18.9 % (ref 12.5–14.5)
GLUCOSE: 109 MG/DL (ref 70–99)
GRAN #: 4.7 K/UL (ref 1.4–6.5)
GRAN%: 76.5 %
HCT VFR BLD AUTO: 36.9 % (ref 39–55)
HGB BLD-MCNC: 11.6 G/DL (ref 14–16)
IMMATURE GRANS (ABS): 0 K/UL (ref 0–1)
IMMATURE GRANULOCYTES: 0.7 %
LYMPH #: 1 K/UL (ref 1.2–3.4)
LYMPH%: 15.5 %
MCH RBC QN AUTO: 28.5 PG (ref 25–35)
MCHC RBC AUTO-ENTMCNC: 31.4 G/DL (ref 31–36)
MCV RBC AUTO: 90.7 FL (ref 80–100)
MONO #: 0.4 K/UL (ref 0.1–0.6)
MONO%: 6.5 %
NUCLEATED RBCS: 0 %
NUCLEATED RED BLOOD CELLS: 0 /100 WBC
PERFORMED BY:: ABNORMAL
PLATELET # BLD AUTO: 59 K/UL (ref 140–440)
PMV BLD AUTO: 8.2 FL (ref 8.8–12.7)
POTASSIUM SERPL-SCNC: 3.7 MMOL/L (ref 3.5–5)
PROT SERPL-MCNC: 5.9 G/DL (ref 6–8.2)
RBC # BLD AUTO: 4.07 M/UL (ref 4.3–5.9)
SODIUM: 136 MMOL/L (ref 134–144)
WBC # BLD: 6.1 K/UL (ref 5–10)

## 2017-08-07 RX ORDER — SODIUM CHLORIDE 0.9 % (FLUSH) 0.9 %
10 SYRINGE (ML) INJECTION
Status: CANCELLED | OUTPATIENT
Start: 2017-08-10

## 2017-08-07 RX ORDER — HEPARIN 100 UNIT/ML
500 SYRINGE INTRAVENOUS
Status: CANCELLED | OUTPATIENT
Start: 2017-08-10

## 2017-08-07 RX ORDER — FLUOROURACIL 50 MG/ML
400 INJECTION, SOLUTION INTRAVENOUS ONCE
Status: CANCELLED | OUTPATIENT
Start: 2017-08-08

## 2017-08-14 ENCOUNTER — HISTORICAL (OUTPATIENT)
Dept: ADMINISTRATIVE | Facility: HOSPITAL | Age: 62
End: 2017-08-14

## 2017-08-14 LAB
ALBUMIN SERPL-MCNC: 2.6 G/DL (ref 3.1–4.7)
ALP SERPL-CCNC: 212 IU/L (ref 40–104)
ALT (SGPT): 9 IU/L (ref 3–33)
AST SERPL-CCNC: 25 IU/L (ref 10–40)
BASOPHILS NFR BLD: 0 K/UL (ref 0–0.2)
BASOPHILS NFR BLD: 0.6 %
BILIRUB SERPL-MCNC: 0.9 MG/DL (ref 0.3–1)
BUN SERPL-MCNC: 13 MG/DL (ref 8–20)
CALCIUM SERPL-MCNC: 8.9 MG/DL (ref 7.7–10.4)
CHLORIDE: 96 MMOL/L (ref 98–110)
CO2 SERPL-SCNC: 27.6 MMOL/L (ref 22.8–31.6)
CREATININE: 0.75 MG/DL (ref 0.6–1.4)
EOSINOPHIL NFR BLD: 0 K/UL (ref 0–0.7)
EOSINOPHIL NFR BLD: 0.3 %
ERYTHROCYTE [DISTWIDTH] IN BLOOD BY AUTOMATED COUNT: 17.2 % (ref 12.5–14.5)
GLUCOSE: 88 MG/DL (ref 70–99)
GRAN #: 2.6 K/UL (ref 1.4–6.5)
GRAN%: 72.7 %
HCT VFR BLD AUTO: 34.3 % (ref 39–55)
HGB BLD-MCNC: 11 G/DL (ref 14–16)
IMMATURE GRANS (ABS): 0 K/UL (ref 0–1)
IMMATURE GRANULOCYTES: 0.3 %
LYMPH #: 0.8 K/UL (ref 1.2–3.4)
LYMPH%: 22.8 %
MCH RBC QN AUTO: 28.8 PG (ref 25–35)
MCHC RBC AUTO-ENTMCNC: 32.1 G/DL (ref 31–36)
MCV RBC AUTO: 89.8 FL (ref 80–100)
MONO #: 0.1 K/UL (ref 0.1–0.6)
MONO%: 3.3 %
NUCLEATED RBCS: 0 %
NUCLEATED RED BLOOD CELLS: 0 /100 WBC
PERFORMED BY:: ABNORMAL
PLATELET # BLD AUTO: 56 K/UL (ref 140–440)
PMV BLD AUTO: 8.9 FL (ref 8.8–12.7)
POTASSIUM SERPL-SCNC: 3.5 MMOL/L (ref 3.5–5)
PROT SERPL-MCNC: 6 G/DL (ref 6–8.2)
RBC # BLD AUTO: 3.82 M/UL (ref 4.3–5.9)
SODIUM: 134 MMOL/L (ref 134–144)
WBC # BLD: 3.6 K/UL (ref 5–10)

## 2017-08-15 ENCOUNTER — OFFICE VISIT (OUTPATIENT)
Dept: HEMATOLOGY/ONCOLOGY | Facility: CLINIC | Age: 62
End: 2017-08-15
Payer: MEDICAID

## 2017-08-15 DIAGNOSIS — C18.9 COLON CANCER METASTASIZED TO LIVER: Primary | ICD-10-CM

## 2017-08-15 DIAGNOSIS — D70.1 CHEMOTHERAPY-INDUCED NEUTROPENIA: Primary | ICD-10-CM

## 2017-08-15 DIAGNOSIS — T45.1X5A CHEMOTHERAPY-INDUCED NEUTROPENIA: Primary | ICD-10-CM

## 2017-08-15 DIAGNOSIS — C78.7 COLON CANCER METASTASIZED TO LIVER: Primary | ICD-10-CM

## 2017-08-15 PROCEDURE — 3008F BODY MASS INDEX DOCD: CPT | Mod: ,,, | Performed by: INTERNAL MEDICINE

## 2017-08-15 PROCEDURE — 99214 OFFICE O/P EST MOD 30 MIN: CPT | Mod: ,,, | Performed by: INTERNAL MEDICINE

## 2017-08-16 NOTE — PROGRESS NOTES
[facility] HEME/ONC PROGRESS NOTE      Subjective:       Patient ID:   Sarbjit Ruiz  62 y.o. male.  1955                          Cristal Black      Chief Complaint:  Colon cancer follow-up    History of Present Illness:     Patient returns today for a regularly scheduled follow-up visit.   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  He has liver metastases, failure to thrive, chronic pain sx.    He has completed 4 courses of FOLFOX and Avastin. Nausea has not been a problem. He smokes marijuana to help with nausea control. He does admit to having some mucositis with mouth ulcers at the lip. Appetite is improved. He does not take his Megace but he does smoke marijuana to help with nausea control and try and increase his appetite. He denies pain, but admits to shortness of breath with exertion.  Besides the mucositis sx, he also has diarrhea 1-2 per day.    He is having 1-2 soft BM daily and takes his Lomotil daily.    Clinically he appears to have decreased depression sx,, he is on Lexapro to 20 mg by mouth daily.    We have been giving him the chemotherapy every 3 weeks, would like to try him on an every 2 week schedule if mucositis resolves, GI symptoms remain stable, and leukopenia improves. He is on Neupogen daily ×4 days and follow-up CBC will be checked on Monday    Await return of GI sx to NL, and lab pending.  Magic mouthwash trial.    CEA better 3,600.  Check CAT of liver after # 4.    Hold off on further chemo, till mucositis resolves, GI sx resolve and CEA and CAT scan are reviewed.  CAT 8/28.  See me 8/29.    ROS:   GEN: normal without any fever, night sweats or weight loss  HEENT: normal with no HA's, sore throat, stiff neck, changes in vision.e does admit to some mucositis secondary to chemotherapy.  CV: normal with no CP, SOB, PND, HODGES or orthopnea  PULM: HODGES +., no SOB, cough, hemoptysis, sputum or pleuritic pain  GI: See HPI, Liver metasteses. no abdominal pain, nausea, vomiting,  constipation, diarrhea, melanotic stools, BRBPR, or hematemesis  : BPH.+.   normal with no hematuria, dysuria  BREAST: normal with no mass, discharge, pain  SKIN: normal with no rash, erythema, bruising, or swelling  Psych: depression hx      Hx hernia repair, T & A.      Allergies:  Review of patient's allergies indicates:  No Known Allergies    Medications:    Current Outpatient Prescriptions:     alprazolam (XANAX) 0.25 MG tablet, TK 1 T PO Q 8 H PRN P, Disp: 60 tablet, Rfl: 0    calcium carbonate (CALCIUM ANTACID) 300 mg (750 mg) Chew, Take 1 mg by mouth 2 (two) times daily as needed., Disp: , Rfl:     DIPHENOXYLATE HCL/ATROPINE (LOMOTIL ORAL), Take 1 tablet by mouth once daily., Disp: , Rfl:     enoxaparin (LOVENOX) 40 mg/0.4 mL Syrg, INJECT 40 MG SUBCUTANEOUSLY Q 12 H, Disp: , Rfl: 4    escitalopram oxalate (LEXAPRO) 20 MG tablet, Take 20 mg by mouth once daily., Disp: , Rfl:     hydrocodone-acetaminophen 7.5-325mg (NORCO) 7.5-325 mg per tablet, Take 1 tablet by mouth every 6 (six) hours as needed for Pain., Disp: 60 tablet, Rfl: 0    L. ACIDOPHILUS/BIFID. ANIMALIS (ONE-A-DAY TRUBIOTICS ORAL), Take by mouth., Disp: , Rfl:     ranitidine (ZANTAC) 300 MG capsule, Take 1 capsule (300 mg total) by mouth every evening., Disp: 30 capsule, Rfl: 3  No current facility-administered medications for this visit.       Objective:     Vitals:  There were no vitals taken for this visit.    Physical Examination:   GEN: chronically ill in appearance., comfortable; AAOx3  HEAD: atraumatic and normocephalic  EYES: + ,  pallor, no icterus, PERRLA  ENT: OMM, no pharyngeal erythema, small area of ulceration noted at the lower inner lip, no thrush  NECK: no masses, thyroid normal, no LAD/LN's, supple  CV: RRR with no murmur; normal pulse  CHEST: Normal respiratory effort; CTAB; normal breath sounds; no wheeze or crackles  ABDOM: nontender and nondistended; soft; normal bowel sounds; no rebound/guarding, abdomenal wound  healing  MUSC/Skeletal: ROM normal; no crepitus; joints normal  EXTREM: no clubbing, cyanosis, inflammation or swelling.left and right calf are nontender without palpable venous cord  SKIN: no rashes, lesions, ulcers, petechiae   : no torre  NEURO: grossly intact; motor/sensory WNL; AAOx3; no tremors  PSYCH: normal mood, affect and behavior  LYMPH: normal cervical, supraclavicular, axillary and groin LN's          Labs:  Baseline CEA in the 8,000 range,.on repeat determination CEA is improved at 4600. And now better at 3,600.  Due for follow-up lab on8/21.        Radiology/Diagnostic Studies:    CAT 3/2017  Liver metasteses +.  Recheck CAT after #4.    Assessment/Plan:   (1) 62 y.o. male with diagnosis of stage 4 colon cancer, metastatic dx to liver, S/P course #4 of FOLFOX Avastin.       Check CEA and CAT, see me 8/29.  Likely further chemo and evaluation per Dr. Gonzales for Y 90        Intra hepatic Rx.    (2)Iron deficiency anemia, S/P ferrlicet infusion.    (3) Refer to Dr. Gonzales after 4 cycles of chemo, for Y90 eval?       Watch for cold intolerance, peripheral neuropathy, wound complications.                   Discussion:     I have explained and the patient understands all of  the current recommendation(s). I have answered all of their questions to the best of my ability and to their complete satisfaction.   The patient is to continue with the current management plan.    RTC in 8/29.

## 2017-08-21 ENCOUNTER — TELEPHONE (OUTPATIENT)
Dept: HEMATOLOGY/ONCOLOGY | Facility: CLINIC | Age: 62
End: 2017-08-21

## 2017-08-21 LAB
ALBUMIN SERPL-MCNC: 2.9 G/DL (ref 3.1–4.7)
ALP SERPL-CCNC: 245 IU/L (ref 40–104)
ALT (SGPT): 7 IU/L (ref 3–33)
AST SERPL-CCNC: 24 IU/L (ref 10–40)
BASOPHILS NFR BLD: 0 K/UL (ref 0–0.2)
BASOPHILS NFR BLD: 0.9 %
BILIRUB SERPL-MCNC: 0.9 MG/DL (ref 0.3–1)
BUN SERPL-MCNC: 9 MG/DL (ref 8–20)
CALCIUM SERPL-MCNC: 8.3 MG/DL (ref 7.7–10.4)
CEA: 1408.2 NG/ML
CHLORIDE: 96 MMOL/L (ref 98–110)
CO2 SERPL-SCNC: 27.7 MMOL/L (ref 22.8–31.6)
CREATININE: 0.75 MG/DL (ref 0.6–1.4)
EOSINOPHIL NFR BLD: 0 K/UL (ref 0–0.7)
EOSINOPHIL NFR BLD: 0.9 %
ERYTHROCYTE [DISTWIDTH] IN BLOOD BY AUTOMATED COUNT: 18.3 % (ref 12.5–14.5)
GLUCOSE: 97 MG/DL (ref 70–99)
GRAN #: 0.8 K/UL (ref 1.4–6.5)
GRAN%: 37.6 %
HCT VFR BLD AUTO: 37.1 % (ref 39–55)
HGB BLD-MCNC: 11.7 G/DL (ref 14–16)
IMMATURE GRANS (ABS): 0.1 K/UL (ref 0–1)
IMMATURE GRANULOCYTES: 4.5 %
LYMPH #: 0.8 K/UL (ref 1.2–3.4)
LYMPH%: 37.1 %
MCH RBC QN AUTO: 28.5 PG (ref 25–35)
MCHC RBC AUTO-ENTMCNC: 31.5 G/DL (ref 31–36)
MCV RBC AUTO: 90.3 FL (ref 80–100)
MONO #: 0.4 K/UL (ref 0.1–0.6)
MONO%: 19 %
NUCLEATED RBCS: 0 %
NUCLEATED RED BLOOD CELLS: 0 /100 WBC
PERFORMED BY:: ABNORMAL
PLATELET # BLD AUTO: 57 K/UL (ref 140–440)
PMV BLD AUTO: 10.4 FL (ref 8.8–12.7)
POTASSIUM SERPL-SCNC: 2.7 MMOL/L (ref 3.5–5)
PROT SERPL-MCNC: 6.1 G/DL (ref 6–8.2)
RBC # BLD AUTO: 4.11 M/UL (ref 4.3–5.9)
SODIUM: 136 MMOL/L (ref 134–144)
WBC # BLD: 2.2 K/UL (ref 5–10)

## 2017-08-21 NOTE — TELEPHONE ENCOUNTER
Called CG to notify that potassium level was low and  covering for Dr. Pelletier ordered KCL 20 meq BID for 3 days then daily after that continue to check labs weekly. ROSA, dispensed #[phone] refills.

## 2017-08-29 ENCOUNTER — OFFICE VISIT (OUTPATIENT)
Dept: HEMATOLOGY/ONCOLOGY | Facility: CLINIC | Age: 62
End: 2017-08-29
Payer: MEDICAID

## 2017-08-29 VITALS
TEMPERATURE: 96 F | SYSTOLIC BLOOD PRESSURE: 114 MMHG | HEART RATE: 89 BPM | WEIGHT: 126.63 LBS | DIASTOLIC BLOOD PRESSURE: 83 MMHG | BODY MASS INDEX: 16.7 KG/M2

## 2017-08-29 DIAGNOSIS — C18.4 MALIGNANT NEOPLASM OF TRANSVERSE COLON: Primary | ICD-10-CM

## 2017-08-29 LAB
BASOPHILS NFR BLD: 0 K/UL (ref 0–0.2)
BASOPHILS NFR BLD: 0.4 %
EOSINOPHIL NFR BLD: 0 K/UL (ref 0–0.7)
EOSINOPHIL NFR BLD: 0.2 %
ERYTHROCYTE [DISTWIDTH] IN BLOOD BY AUTOMATED COUNT: 17.7 % (ref 12.5–14.5)
GRAN #: 3.5 K/UL (ref 1.4–6.5)
GRAN%: 69.8 %
HCT VFR BLD AUTO: 36.1 % (ref 39–55)
HGB BLD-MCNC: 11.6 G/DL (ref 14–16)
IMMATURE GRANS (ABS): 0.1 K/UL (ref 0–1)
IMMATURE GRANULOCYTES: 1.4 %
LYMPH #: 1 K/UL (ref 1.2–3.4)
LYMPH%: 20 %
MCH RBC QN AUTO: 28.6 PG (ref 25–35)
MCHC RBC AUTO-ENTMCNC: 32.1 G/DL (ref 31–36)
MCV RBC AUTO: 88.9 FL (ref 80–100)
MONO #: 0.4 K/UL (ref 0.1–0.6)
MONO%: 8.2 %
NUCLEATED RBCS: 0 %
NUCLEATED RED BLOOD CELLS: 0 /100 WBC
PERFORMED BY:: ABNORMAL
PLATELET # BLD AUTO: 45 K/UL (ref 140–440)
PMV BLD AUTO: 9.3 FL (ref 8.8–12.7)
RBC # BLD AUTO: 4.06 M/UL (ref 4.3–5.9)
WBC # BLD: 5 K/UL (ref 5–10)

## 2017-08-29 PROCEDURE — 99213 OFFICE O/P EST LOW 20 MIN: CPT | Mod: ,,, | Performed by: INTERNAL MEDICINE

## 2017-08-29 PROCEDURE — 3008F BODY MASS INDEX DOCD: CPT | Mod: ,,, | Performed by: INTERNAL MEDICINE

## 2017-08-29 RX ORDER — POTASSIUM CHLORIDE 20 MEQ/1
TABLET, EXTENDED RELEASE ORAL
Refills: 0 | COMMUNITY
Start: 2017-08-21

## 2017-08-29 RX ORDER — DIPHENOXYLATE HYDROCHLORIDE AND ATROPINE SULFATE 2.5; .025 MG/1; MG/1
TABLET ORAL
Refills: 1 | COMMUNITY
Start: 2017-07-29

## 2017-09-01 ENCOUNTER — TELEPHONE (OUTPATIENT)
Dept: HEMATOLOGY/ONCOLOGY | Facility: CLINIC | Age: 62
End: 2017-09-01

## 2017-09-01 NOTE — TELEPHONE ENCOUNTER
----- Message from Tenisha Choudhury sent at 9/1/2017  1:50 PM CDT -----  Contact: becca pt ex wife/caretaker call back # [phone]  Pt care taker called in asking for nurse to call her back about getting information about hospice, she thinks pt is at that point. Her call back # is above and they have an apt on 09/08

## 2017-09-05 ENCOUNTER — TELEPHONE (OUTPATIENT)
Dept: HEMATOLOGY/ONCOLOGY | Facility: CLINIC | Age: 62
End: 2017-09-05

## 2017-09-05 NOTE — TELEPHONE ENCOUNTER
----- Message from Tenisha Choudhury sent at 9/5/2017  9:07 AM CDT -----  Pt caretaker called in again asking for nurse to call her  Back this morning about message below. Call back # [phone]          ----- Message from Tenisha Kei sent at 9/1/2017  1:50 PM CDT -----  Contact: becca lauren ex wife/caretaker call back # [phone]  Pt care taker called in asking for nurse to call her back about getting information about hospice, she thinks pt is at that point. Her call back # is above and they have an apt on 09/08

## 2017-09-05 NOTE — TELEPHONE ENCOUNTER
"Called Cg back/ she was crying and stated that she had been up since 5 AM trying to get pt to eat and drink and he was just getting weaker and weaker by the minute. She asked him about hospice and pt was able to say" what about treatment with Dr. Gonzales"? It seems even though pt is still wanting to seek tx options. Cg said he was vomiting dark green bile and keeping nothing down. Inst. That we could make a referral to Hospice or she could also take pt to ER and let them get nausea under control and get him hydrated so he could make  The decision on what he wants in a better state of mind. If pt was admitted hospice, they  could visit while inpatient and discuss program and he could decide about what to do. She said she had called his niece and they would either bring him in or call an ambulance to come and get him.  "

## 2017-09-05 NOTE — PROGRESS NOTES
[facility] HEME/ONC PROGRESS NOTE      Subjective:       Patient ID:   Sarbjit Ruiz  62 y.o. male.  1955                          Cristal Black      Chief Complaint:  Colon cancer follow-up    History of Present Illness:     Patient returns today for a regularly scheduled follow-up visit.   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  He has liver metastases, failure to thrive, chronic pain sx.    He has completed 4 courses of FOLFOX and Avastin. Nausea has not been a problem. He smokes marijuana to help with nausea control. He does admit to having some mucositis with mouth ulcers at the lip. Appetite is improved. He does not take his Megace but he does smoke marijuana to help with nausea control and try and increase his appetite. He denies pain, but admits to shortness of breath with exertion.  Besides the mucositis sx, he also has diarrhea 1-2 per day.    Mucositis continues to be a problem, after last chemo, lower lip raw,reddened.  He is having 1-2 soft BM daily and takes his Lomotil daily.    Clinically he appears to have decreased depression sx,, he is on Lexapro to 20 mg by mouth daily.    We have been giving him the chemotherapy every 3 weeks, would like to try him on an every 2 week schedule if mucositis resolves, GI symptoms remain stable, and leukopenia improves. He is on Neupogen daily ×4 days and follow-up CBC will be checked on Monday.    Await return of GI sx to NL, and lab pending.  Magic mouthwash trial, helped some with mucositis.    CEA better 3,600 down to 1,408.  Check CAT of liver after # 4.    Hold off on further chemo, till mucositis resolves, GI sx resolve and CEA and CAT scan are reviewed.  Cat was deferred, he was nauseated and not able to take oral contrast.  I have re scheduled Cat for re evaluation.    ROS:   GEN: normal without any fever, night sweats or weight loss  HEENT: normal with no HA's, sore throat, stiff neck, changes in vision.e does admit to some mucositis  secondary to chemotherapy.  CV: normal with no CP, SOB, PND, HODGES or orthopnea  PULM: HODGES +., no SOB, cough, hemoptysis, sputum or pleuritic pain  GI: See HPI, Liver metasteses. no abdominal pain, nausea, vomiting, constipation, diarrhea, melanotic stools, BRBPR, or hematemesis  : BPH.+.   normal with no hematuria, dysuria  BREAST: normal with no mass, discharge, pain  SKIN: normal with no rash, erythema, bruising, or swelling  Psych: depression hx      Hx hernia repair, T & A.      Allergies:  Review of patient's allergies indicates:  No Known Allergies    Medications:    Current Outpatient Prescriptions:     alprazolam (XANAX) 0.25 MG tablet, TK 1 T PO Q 8 H PRN P, Disp: 60 tablet, Rfl: 0    calcium carbonate (CALCIUM ANTACID) 300 mg (750 mg) Chew, Take 1 mg by mouth 2 (two) times daily as needed., Disp: , Rfl:     DIPHENOXYLATE HCL/ATROPINE (LOMOTIL ORAL), Take 1 tablet by mouth once daily., Disp: , Rfl:     diphenoxylate-atropine 2.5-0.025 mg (LOMOTIL) 2.5-0.025 mg per tablet, TK 2 TS PO QID PRN, Disp: , Rfl: 1    enoxaparin (LOVENOX) 40 mg/0.4 mL Syrg, INJECT 40 MG SUBCUTANEOUSLY Q 12 H, Disp: , Rfl: 4    escitalopram oxalate (LEXAPRO) 20 MG tablet, Take 20 mg by mouth once daily., Disp: , Rfl:     hydrocodone-acetaminophen 7.5-325mg (NORCO) 7.5-325 mg per tablet, Take 1 tablet by mouth every 6 (six) hours as needed for Pain., Disp: 60 tablet, Rfl: 0    L. ACIDOPHILUS/BIFID. ANIMALIS (ONE-A-DAY TRUBIOTICS ORAL), Take by mouth., Disp: , Rfl:     potassium chloride SA (K-DUR,KLOR-CON) 20 MEQ tablet, , Disp: , Rfl: 0    ranitidine (ZANTAC) 300 MG capsule, Take 1 capsule (300 mg total) by mouth every evening., Disp: 30 capsule, Rfl: 3      Objective:     Vitals:  Blood pressure 114/83, pulse 89, temperature (!) 95.7 °F (35.4 °C), weight 57.4 kg (126 lb 9.6 oz).    Physical Examination:   GEN: chronically ill in appearance., comfortable  HEAD: atraumatic and normocephalic  EYES: + ,  pallor, no  icterus  ENT: no pharyngeal erythema, small area of ulceration noted at the lower inner lip, no thrush  NECK: no masses, thyroid normal, no LAD/LN's, supple  CV: RRR with no murmur; normal pulse  CHEST: Normal respiratory effort; CTAB; normal breath sounds; no wheeze or crackles  ABDOM: nontender and nondistended; soft; normal bowel sounds; no rebound/guarding, abdomenal wound healing  MUSC/Skeletal: ROM normal; no crepitus; joints normal  EXTREM: no clubbing, cyanosis, inflammation or swelling.left and right calf are nontender without palpable venous cord  SKIN: no rashes, lesions, ulcers, petechiae   : no torre  NEURO: grossly intact; motor/sensory WNL; no tremors  PSYCH: normal mood, affect and behavior  LYMPH: normal cervical, supraclavicular, axillary and groin LN's          Labs:  Baseline CEA in the 8,000 range,.on repeat determination CEA is improved at 4600. And now better at 3,600 and down further to 1,408.    Radiology/Diagnostic Studies:    CAT 3/2017  Liver metasteses +.  Recheck CAT after #4., ordered.    Assessment/Plan:   (1) 62 y.o. male with diagnosis of stage 4 colon cancer, metastatic dx to liver, S/P course #4 of FOLFOX Avastin. Check CAT  Likely further chemo and evaluation per Dr. Gonzales for Y 90Intra hepatic Rx.    (2)Iron deficiency anemia, S/P ferrlicet infusion.    (3) Refer to Dr. Gonzales after 4 cycles of chemo, for Y90 eval?   Watch for cold intolerance, peripheral neuropathy, wound complications.        Discussion:

## 2017-09-06 ENCOUNTER — TELEPHONE (OUTPATIENT)
Dept: HEMATOLOGY/ONCOLOGY | Facility: CLINIC | Age: 62
End: 2017-09-06

## 2017-09-06 NOTE — TELEPHONE ENCOUNTER
Followed up with Lio Alicia who states was unable to get pt in to ER. Pt. refused to go and refused Hospice at this time. Cg said she would keep us posted and let us know when he agrees to [facility] chidial.

## 2018-07-16 ENCOUNTER — TELEPHONE (OUTPATIENT)
Dept: HEMATOLOGY/ONCOLOGY | Facility: CLINIC | Age: 63
End: 2018-07-16

## 2022-12-17 NOTE — TELEPHONE ENCOUNTER
IV hydration and IV nausea meds  D/c home with nausea meds   Spoke with patient's wife to inform her that an appointment was scheduled with Dr. Viola Amin for 6/21/17 @ 9:20am.  Instructed to arrive 20 minutes early to complete new patient information.  Dr. Amin's telephone number and address was provided in the event the appointment needs to be changed.